# Patient Record
Sex: FEMALE | Race: WHITE | NOT HISPANIC OR LATINO | Employment: FULL TIME | URBAN - NONMETROPOLITAN AREA
[De-identification: names, ages, dates, MRNs, and addresses within clinical notes are randomized per-mention and may not be internally consistent; named-entity substitution may affect disease eponyms.]

---

## 2021-12-02 ENCOUNTER — APPOINTMENT (OUTPATIENT)
Dept: GENERAL RADIOLOGY | Facility: HOSPITAL | Age: 61
End: 2021-12-02

## 2021-12-02 PROCEDURE — 71046 X-RAY EXAM CHEST 2 VIEWS: CPT

## 2021-12-02 PROCEDURE — 86664 EPSTEIN-BARR NUCLEAR ANTIGEN: CPT | Performed by: NURSE PRACTITIONER

## 2021-12-02 PROCEDURE — 85027 COMPLETE CBC AUTOMATED: CPT | Performed by: NURSE PRACTITIONER

## 2021-12-02 PROCEDURE — 80053 COMPREHEN METABOLIC PANEL: CPT | Performed by: NURSE PRACTITIONER

## 2021-12-02 PROCEDURE — 86665 EPSTEIN-BARR CAPSID VCA: CPT | Performed by: NURSE PRACTITIONER

## 2021-12-02 PROCEDURE — U0004 COV-19 TEST NON-CDC HGH THRU: HCPCS | Performed by: NURSE PRACTITIONER

## 2021-12-12 ENCOUNTER — TRANSCRIBE ORDERS (OUTPATIENT)
Dept: ADMINISTRATIVE | Facility: HOSPITAL | Age: 61
End: 2021-12-12

## 2021-12-12 DIAGNOSIS — R20.0 ARM NUMBNESS: Primary | ICD-10-CM

## 2021-12-12 DIAGNOSIS — R93.0 ABNORMAL MRI OF THE HEAD: ICD-10-CM

## 2021-12-17 PROCEDURE — U0004 COV-19 TEST NON-CDC HGH THRU: HCPCS | Performed by: NURSE PRACTITIONER

## 2021-12-28 ENCOUNTER — HOSPITAL ENCOUNTER (OUTPATIENT)
Dept: MRI IMAGING | Facility: HOSPITAL | Age: 61
Discharge: HOME | End: 2021-12-28
Admitting: PSYCHIATRY & NEUROLOGY

## 2021-12-28 DIAGNOSIS — R20.0 ARM NUMBNESS: ICD-10-CM

## 2021-12-28 DIAGNOSIS — R93.0 ABNORMAL MRI OF THE HEAD: ICD-10-CM

## 2021-12-28 LAB — CREAT BLDA-MCNC: 0.8 MG/DL (ref 0.6–1.3)

## 2021-12-28 PROCEDURE — 70553 MRI BRAIN STEM W/O & W/DYE: CPT

## 2021-12-28 PROCEDURE — 0 GADOBENATE DIMEGLUMINE 529 MG/ML SOLUTION: Performed by: PSYCHIATRY & NEUROLOGY

## 2021-12-28 PROCEDURE — A9577 INJ MULTIHANCE: HCPCS | Performed by: PSYCHIATRY & NEUROLOGY

## 2021-12-28 PROCEDURE — 82565 ASSAY OF CREATININE: CPT

## 2021-12-28 RX ADMIN — GADOBENATE DIMEGLUMINE 18 ML: 529 INJECTION, SOLUTION INTRAVENOUS at 13:23

## 2022-01-12 PROBLEM — J30.2 SEASONAL ALLERGIC RHINITIS: Status: ACTIVE | Noted: 2019-06-17

## 2022-01-12 PROBLEM — F32.A DEPRESSIVE DISORDER: Status: ACTIVE | Noted: 2019-06-17

## 2022-01-12 RX ORDER — TOPIRAMATE 25 MG/1
TABLET ORAL
COMMUNITY
Start: 2021-09-27 | End: 2022-01-13 | Stop reason: SINTOL

## 2022-01-12 RX ORDER — IBUPROFEN 200 MG
200 TABLET ORAL EVERY 6 HOURS PRN
COMMUNITY

## 2022-01-13 ENCOUNTER — OFFICE VISIT (OUTPATIENT)
Dept: NEUROLOGY | Facility: CLINIC | Age: 62
End: 2022-01-13

## 2022-01-13 VITALS
WEIGHT: 194 LBS | HEIGHT: 65 IN | OXYGEN SATURATION: 95 % | SYSTOLIC BLOOD PRESSURE: 136 MMHG | BODY MASS INDEX: 32.32 KG/M2 | DIASTOLIC BLOOD PRESSURE: 82 MMHG | HEART RATE: 71 BPM

## 2022-01-13 DIAGNOSIS — G25.0 ESSENTIAL TREMOR: Primary | ICD-10-CM

## 2022-01-13 PROCEDURE — 99204 OFFICE O/P NEW MOD 45 MIN: CPT | Performed by: PSYCHIATRY & NEUROLOGY

## 2022-01-13 RX ORDER — PRIMIDONE 50 MG/1
TABLET ORAL
Qty: 30 TABLET | Refills: 2 | Status: SHIPPED | OUTPATIENT
Start: 2022-01-13 | End: 2022-01-31 | Stop reason: SDUPTHER

## 2022-01-13 NOTE — PROGRESS NOTES
"Chief Complaint  Tremors (x1.5 years and increased in intensity within the last 6 months. L>R) and Numbness (pt complains of occ left arm numbness/weakness with occ decrease  of left hand)    Subjective        VARUN Meyers presents to [facility]    61-year-old female who presents for evaluation of essential tremor.  She has recently moved to the area and had previously been taken care of by a neurologist in [place].  She was diagnosed with an essential tremor and has been prescribed Topamax.  She notes this helped a little but when taking 50 mg it knocked her out.  Dropping back to 1 pill a day, she still had the side effects.  This is the only medication they tried.  She notes that the tremor worse when typing, and it will cause her to hit a key twice.  It is worse on her left than her right.  Sometimes it is so bad she cannot pick it drink up.  She notices it more if she is tired, or if her blood sugar is down, or the day after drinking alcohol.          Past Medical History:   Diagnosis Date   • Tremor           Current Outpatient Medications:   •  cetirizine (zyrTEC) 10 MG tablet, Take 10 mg by mouth Daily., Disp: , Rfl:   •  ibuprofen (ADVIL,MOTRIN) 200 MG tablet, Take 200 mg by mouth Every 6 (Six) Hours As Needed., Disp: , Rfl:   •  sertraline (Zoloft) 25 MG tablet, Take 50 mg by mouth Daily. One 50 MG tablet and one 25 MG tablet to equal 75 MG daily, Disp: , Rfl:   •  sertraline (ZOLOFT) 50 MG tablet, Take 50 mg by mouth Daily., Disp: , Rfl:        Objective   Vital Signs:   Ht 165.1 cm (65\")   Wt 88 kg (194 lb)   BMI 32.28 kg/m²     Physical Exam  Constitutional:       General: She is awake.   Eyes:      Extraocular Movements: Extraocular movements intact.      Pupils: Pupils are equal, round, and reactive to light.   Neurological:      Mental Status: She is alert.      Deep Tendon Reflexes: Strength normal and reflexes are normal and symmetric.   Psychiatric:  "        Speech: Speech normal.        Neurological Exam  Mental Status  Awake and alert. Oriented to person, place and time. Recent and remote memory are intact. Speech is normal. Language is fluent with no aphasia. Attention and concentration are normal. Fund of knowledge is appropriate for level of education.    Cranial Nerves  CN II: Visual fields full to confrontation.  CN III, IV, VI: Extraocular movements intact bilaterally. Pupils equal round and reactive to light bilaterally.  CN V: Facial sensation is normal.  CN VII: Full and symmetric facial movement.  CN IX, X: Palate elevates symmetrically  CN XI: Shoulder shrug strength is normal.  CN XII: Tongue midline without atrophy or fasciculations.    Motor  Normal muscle bulk throughout. Normal muscle tone. Strength is 5/5 throughout all four extremities.  Low ampltide, high frequenty tremor on left more than right with arms outstretched  No rest tremor  Intention tremor on left more than right  No bradykinesia or rigidity    .    Sensory  Light touch is normal in upper and lower extremities.     Reflexes  Deep tendon reflexes are 2+ and symmetric in all four extremities with downgoing toes bilaterally.    Gait  Casual gait is normal including stance, stride, and arm swing.      Result Review :                     Assessment and Plan   61-year-old female with essential tremor.  She was not able to tolerate Topamax.  She has not tried any other medications for her tremor.  I hesitate to try her on propranolol as she has depression and this could worsen it.  We decided to try her on low-dose primidone.  She does drink alcohol daily and we discussed the additive effects that could cause increase CNS depression/sedation.    Plan:    1.  Start primidone 25 mg nightly.  Can increase to 50 mg nightly in 1 week if tolerating.  2. follow-up 3 months.  Call sooner with any issues.        Follow Up   No follow-ups on file.  Patient was given instructions and counseling  regarding her condition or for health maintenance advice. Please see specific information pulled into the AVS if appropriate.

## 2022-01-31 ENCOUNTER — TELEPHONE (OUTPATIENT)
Dept: NEUROLOGY | Facility: CLINIC | Age: 62
End: 2022-01-31

## 2022-01-31 DIAGNOSIS — G25.0 ESSENTIAL TREMOR: ICD-10-CM

## 2022-01-31 RX ORDER — PRIMIDONE 50 MG/1
50 TABLET ORAL NIGHTLY
Qty: 7 TABLET | Refills: 0 | Status: SHIPPED | OUTPATIENT
Start: 2022-01-31 | End: 2022-04-04

## 2022-01-31 NOTE — TELEPHONE ENCOUNTER
Spoke with the pt and advised that an rx has been sent to requested pharmacy. Pt verbalizes understanding.

## 2022-01-31 NOTE — TELEPHONE ENCOUNTER
Caller: VARUN Meyers    Relationship: Self     Best call back number: [phone]    What medications are you currently taking:   Current Outpatient Medications on File Prior to Visit   Medication Sig Dispense Refill   • cetirizine (zyrTEC) 10 MG tablet Take 10 mg by mouth Daily.     • ibuprofen (ADVIL,MOTRIN) 200 MG tablet Take 200 mg by mouth Every 6 (Six) Hours As Needed.     • primidone (MYSOLINE) 50 MG tablet Take 1/2 tab PO qhs for 1 week. Increase to 1 tab thereafter. 30 tablet 2   • sertraline (Zoloft) 25 MG tablet Take 50 mg by mouth Daily. One 50 MG tablet and one 25 MG tablet to equal 75 MG daily     • sertraline (ZOLOFT) 50 MG tablet Take 50 mg by mouth Daily.       No current facility-administered medications on file prior to visit.          When did you start taking these medications: NA    Which medication are you concerned about: PRIMIDONE    Who prescribed you this medication: DR.DIAL    What are your concerns: PATIENT STATES SHE IS OUT OF TOWN FOR WORK AND LEFT HER PRIMIDONE AT HOME. SHE IS ASKING IF SHE COULD HAVE A WEEK WORTH SENT TO PHARMACY WHILE SHE IS OUT OF TOWN. PHARMACY IS [facility]  [facility] IN [place], [place]. PHONE # IS: [phone].    PATIENT ASKING FOR CALL TO UPDATE ON THIS. PLEASE ADVISE.     How long have you had these concerns: ZACHARY

## 2022-02-09 ENCOUNTER — OFFICE VISIT (OUTPATIENT)
Dept: FAMILY MEDICINE CLINIC | Facility: CLINIC | Age: 62
End: 2022-02-09

## 2022-02-09 VITALS
DIASTOLIC BLOOD PRESSURE: 82 MMHG | HEART RATE: 76 BPM | SYSTOLIC BLOOD PRESSURE: 120 MMHG | TEMPERATURE: 97.6 F | HEIGHT: 65 IN | BODY MASS INDEX: 32.96 KG/M2 | WEIGHT: 197.8 LBS | OXYGEN SATURATION: 98 %

## 2022-02-09 DIAGNOSIS — Z80.3 FAMILY HISTORY OF BREAST CANCER IN MOTHER: ICD-10-CM

## 2022-02-09 DIAGNOSIS — M54.42 LOW BACK PAIN WITH LEFT-SIDED SCIATICA, UNSPECIFIED BACK PAIN LATERALITY, UNSPECIFIED CHRONICITY: Primary | ICD-10-CM

## 2022-02-09 DIAGNOSIS — Z86.010 HISTORY OF ADENOMATOUS POLYP OF COLON: ICD-10-CM

## 2022-02-09 PROCEDURE — 99204 OFFICE O/P NEW MOD 45 MIN: CPT | Performed by: FAMILY MEDICINE

## 2022-02-10 NOTE — PROGRESS NOTES
Chief Complaint  Establish Care    Subjective        History of Present Illness  VARUN Meyers is a 61 y.o. female who presents to [facility] patient.    I reviewed her intake paperwork with her. Her surgical history includes a  in  and a  in  and a skin cancer removal in .  Her only allergy is MORPHINE. She is up to date with her two COVID-19 shots and a booster, has had a flu shot for this season and is up to date with her Tdap, having had that in 2017 and completed the shingles vaccine last year. Her family history includes arthritis, diabetes, cancer, high blood pressure, heart attack, migraine headaches, and stroke. The patient is a current alcohol drinker but her use varies and she stopped smoking 1999. She has never used any chewing tobacco or other kinds of drugs. We reviewed her medications together. We also reviewed past labs.     The patient is taking Zoloft,  a 50  mg pill as well as a 25 mg pill because it is easier to take 2 pills than to crack the 50 mg pill and it is inexpensive. She has also been on primidone since seeing Dr. Masters on 2022. That office note was reviewed today. The tremor has improved some with the primidone and also specifically with cutting back on caffeine. She has noted the tremor gets worse when she goes too long without eating and her sugar is dropping. The patient has not tolerated Topamax from the neurologist in [place] and the patient also uses ibuprofen as needed for headache or any kind of pain. She has some back problems. She does take Zyrtec for allergies.     We reviewed her Care Gaps today and her mammograms have always been normal. Pap smears have always been normal. She had a colonoscopy in 2016. In 2016 she had 3 polyps removed, two of which were precancerous and one of which was benign, and she was told to have a 5-year recall. She currently needs a referral to  gastroenterology here.     She reports in 1994 she had a slipped disc in her low back which was treated by a chiropractor who put the disc back in place. Since then, her back has been weak. She states that she does not exercise. She complains of recent pain after stepping into a hole. The pain starts in her left buttocks and radiates down her posterior left thigh. The pain wraps around to the lateral aspect of her thigh near her left groin. She does have intermittent pain that radiates to her anterior left lower extremity. She did have a scan of her back at the time of the slipped disc. She denies any physical therapy or injections in her back. She was taking ibuprofen every 6 hours and now only takes as needed. She does take ibuprofen at night every couple of days. She denies loss of sensation in her feet.     Result Review :   The following data was reviewed by: Maegan Garcia MD on 02/09/2022:       Labs dated 09/27/2021 show:  •BMP was normal except for a mildly elevated chloride of 110.  •The lipid panel had a cholesterol of 205  •Triglycerides 227  •HDL 55  •  •The A1c was normal at 5.3.  •Free T4 and TSH were both normal at 0.77 and 2.89 respectively.    Labs dated 12/02/2021 show:  •CBC was normal.  •CMP had a glucose of 107  •Carbon dioxide level of 30.0, mildly elevated and otherwise was completely normal.    Chest x-rays from 12/02/2021 were reviewed today. These show:  •These were done for cough, shortness of breath, and bronchitis.   •The lungs were clear.   •The x-ray was negative for any acute abnormalities and the heart limits were normal.    She also had an MRI of her brain with and without contrast that day which shows:  •She had no acute intracranial abnormality.   •No signs of acute ischemia, hemorrhage, or mass, and no evidence of edema demyelinating process.   •She had no abnormal intracranial enhancement.   •She did have paranasal sinusitis.   •The MRI was done for workup of left hand  "tremor.    Also reviewed today were notes from Dr. Osmani Elliott in [place], [place] in the neurology department. She had a 1 year history of bilateral upper extremity tremor. Worse on the left than the right without any resting tremor. It was all an intentional tremor which was affecting her handwriting, using the computer and typing, putting on make-up and holding cups and glasses.     She had no sign of parkinsonism on exam. She did have a high frequency low amplitude postural and kinetic tremor as well as an intention tremor on the left upper extremity. With spiral analysis, she had left greater than right slight to mild intrusion of tremor. The neurologist started her on Topamax and wanted her to titrate to 50 mg at night. He did not give her a betablocker because she was on the SSRI and her heart rate was 60 and he avoided primidone, given the patient drank on a regular basis.        Past Medical History:   Diagnosis Date   • Depression    • Family history of colonic polyps    • History of colon polyps    • Seasonal allergies    • Tremor        Outpatient Medications Prior to Visit   Medication Sig Dispense Refill   • cetirizine (zyrTEC) 10 MG tablet Take 10 mg by mouth Daily.     • ibuprofen (ADVIL,MOTRIN) 200 MG tablet Take 200 mg by mouth Every 6 (Six) Hours As Needed.     • primidone (MYSOLINE) 50 MG tablet Take 1 tablet by mouth Every Night. 7 tablet 0   • sertraline (Zoloft) 25 MG tablet Take 50 mg by mouth Daily. One 50 MG tablet and one 25 MG tablet to equal 75 MG daily     • sertraline (ZOLOFT) 50 MG tablet Take 50 mg by mouth Daily.       No facility-administered medications prior to visit.        Objective   Vital Signs:   /82 (BP Location: Left arm, Patient Position: Sitting, Cuff Size: Adult)   Pulse 76   Temp 97.6 °F (36.4 °C) (Temporal)   Ht 165.1 cm (65\")   Wt 89.7 kg (197 lb 12.8 oz)   SpO2 98%   BMI 32.92 kg/m²       Physical Exam  Vitals reviewed.   Constitutional:     "   Appearance: Normal appearance. She is not ill-appearing.   HENT:      Right Ear: External ear normal.      Left Ear: External ear normal.      Nose: Nose normal.   Eyes:      Extraocular Movements: Extraocular movements intact.      Conjunctiva/sclera: Conjunctivae normal.   Cardiovascular:      Rate and Rhythm: Normal rate and regular rhythm.      Heart sounds: No murmur heard.      Pulmonary:      Effort: Pulmonary effort is normal.      Breath sounds: Normal breath sounds.   Abdominal:      Palpations: Abdomen is soft.      Tenderness: There is no abdominal tenderness.   Musculoskeletal:         General: No swelling. Normal range of motion.      Right lower leg: No edema.      Left lower leg: No edema.      Comments: Stiff in lower back, points to L lateral lumbar area and buttock as source of pain   Lymphadenopathy:      Head:      Right side of head: No tonsillar adenopathy.      Left side of head: No tonsillar adenopathy.      Cervical: No cervical adenopathy.   Skin:     General: Skin is warm.      Findings: No rash.   Neurological:      General: No focal deficit present.      Mental Status: She is alert and oriented to person, place, and time.      Comments: She has no significant tremor of either hand but on extending her hand and trying to hold her arm up tightly, she has a low amplitude, high frequency, slight tremor of the fingers. She has no tremor of her head or neck. No interference with her speech pattern and she has no difficulty changing positions. Her gait appears normal and she is not off balance.   Psychiatric:         Mood and Affect: Mood normal.         Behavior: Behavior normal.               Assessment and Plan    Diagnoses and all orders for this visit:    1. Low back pain with left-sided sciatica, unspecified back pain laterality, unspecified chronicity (Primary)  - The plan is for her to see Dr. Edward for OMT on her back and some readjustment and balancing. I expect that will relieve  the pain that is shooting into her left buttock area and eased some of the lower back pain that has been causing her to have to take more ibuprofen than usual and been interfering with her sleep.     2. History of adenomatous polyp of colon  -     Ambulatory Referral to Gastroenterology    3. Family history of breast cancer in mother  - She will need a mammogram sometime in the next 6 to 12 months. Her last mammogram was done in the late 09/2021, early 10/2021 timeframe, and she is willing to wait until a year from now. She will come in sooner if she has any new lumps or anything she is concerned about given her family history of breast cancer in her mother. She will put the Pap off until she comes a year from now and has requested a referral to GI for her history of precancerous colon polyps. She is not having any GI symptoms at this time.     She does not know how much Zoloft she has left and will call here as needed for refills. The primidone will be prescribed by Dr. Masters and she has a follow-up there in 04/2022. She declined a prescription for higher dose ibuprofen today and will continue to use the over the counter pills taking up to 600 mg at a time.      She will call later in the year if she wants to do her mammogram sooner than next 02/2023 and follow up with me as needed before then.         Follow Up   Return for Annual physical/ Well [facility]/Pap.    Patient was given instructions and counseling regarding her condition or for health maintenance advice.     Please see specific information/handouts pulled into the AVS if appropriate.     Maegan Garcia M.D.  [facility]   Family Medicine    Patient verbalized consent to the visit recording.  Transcribed from ambient dictation for Maegan Garcia MD by Pebbles Mckenzie.   02/10/22   15:28 CST

## 2022-02-21 ENCOUNTER — OFFICE VISIT (OUTPATIENT)
Dept: FAMILY MEDICINE CLINIC | Facility: CLINIC | Age: 62
End: 2022-02-21

## 2022-02-21 VITALS
OXYGEN SATURATION: 97 % | DIASTOLIC BLOOD PRESSURE: 79 MMHG | BODY MASS INDEX: 33.49 KG/M2 | SYSTOLIC BLOOD PRESSURE: 128 MMHG | HEIGHT: 65 IN | TEMPERATURE: 98 F | HEART RATE: 75 BPM | WEIGHT: 201 LBS

## 2022-02-21 DIAGNOSIS — M99.04 SOMATIC DYSFUNCTION OF SPINE, SACRAL: ICD-10-CM

## 2022-02-21 DIAGNOSIS — G89.29 CHRONIC MIDLINE LOW BACK PAIN WITHOUT SCIATICA: Primary | ICD-10-CM

## 2022-02-21 DIAGNOSIS — M99.05 SOMATIC DYSFUNCTION OF PELVIC REGION: ICD-10-CM

## 2022-02-21 DIAGNOSIS — M99.03 SOMATIC DYSFUNCTION OF SPINE, LUMBAR: ICD-10-CM

## 2022-02-21 DIAGNOSIS — M99.06 SOMATIC DYSFUNCTION OF LOWER EXTREMITY: ICD-10-CM

## 2022-02-21 DIAGNOSIS — M54.50 CHRONIC MIDLINE LOW BACK PAIN WITHOUT SCIATICA: Primary | ICD-10-CM

## 2022-02-21 PROCEDURE — 99213 OFFICE O/P EST LOW 20 MIN: CPT | Performed by: FAMILY MEDICINE

## 2022-02-21 PROCEDURE — 98926 OSTEOPATH MANJ 3-4 REGIONS: CPT | Performed by: FAMILY MEDICINE

## 2022-02-23 ENCOUNTER — OFFICE VISIT (OUTPATIENT)
Dept: GASTROENTEROLOGY | Facility: CLINIC | Age: 62
End: 2022-02-23

## 2022-02-23 VITALS
DIASTOLIC BLOOD PRESSURE: 78 MMHG | BODY MASS INDEX: 32.82 KG/M2 | HEIGHT: 65 IN | HEART RATE: 86 BPM | TEMPERATURE: 97.4 F | SYSTOLIC BLOOD PRESSURE: 130 MMHG | OXYGEN SATURATION: 98 % | WEIGHT: 197 LBS

## 2022-02-23 DIAGNOSIS — Z83.71 FAMILY HISTORY OF POLYPS IN THE COLON: ICD-10-CM

## 2022-02-23 DIAGNOSIS — Z86.010 HISTORY OF ADENOMATOUS POLYP OF COLON: Primary | ICD-10-CM

## 2022-02-23 PROBLEM — Z86.0101 HISTORY OF ADENOMATOUS POLYP OF COLON: Status: ACTIVE | Noted: 2022-02-23

## 2022-02-23 PROBLEM — Z83.719 FAMILY HISTORY OF POLYPS IN THE COLON: Status: ACTIVE | Noted: 2022-02-23

## 2022-02-23 PROCEDURE — S0285 CNSLT BEFORE SCREEN COLONOSC: HCPCS | Performed by: NURSE PRACTITIONER

## 2022-02-23 RX ORDER — SODIUM PICOSULFATE, MAGNESIUM OXIDE, AND ANHYDROUS CITRIC ACID 10; 3.5; 12 MG/160ML; G/160ML; G/160ML
1 LIQUID ORAL TAKE AS DIRECTED
Qty: 160 ML | Refills: 0 | Status: SHIPPED | OUTPATIENT
Start: 2022-02-23 | End: 2022-03-18 | Stop reason: HOSPADM

## 2022-02-23 NOTE — PROGRESS NOTES
"Chief Complaint   Patient presents with   • Colon Cancer Screening     Pt presents today for evaluation for colonoscopy-pt's last colon was in 2016 in [place]-pt states she had 3 polyps-2 were precancerous and one was benign; Family history of colon polyps      Subjective   HPI  VARUN Meyers is a 61 y.o. female who presents as a referral for preventative maintenance. She has no complaints of nausea or vomiting. No change in bowels. No wt loss. No BRBPR. No melena. There is no family hx for colon cancer. No abdominal pain. There was no Cologuard screening this year.  The patient tells me that she had a colonoscopy \"sometime in 2016 between August and the end of the year with findings of 3 polyps and 2 were precancerous.\"  Past Medical History:   Diagnosis Date   • Depression    • Family history of colonic polyps    • History of colon polyps    • Seasonal allergies    • Tremor      Past Surgical History:   Procedure Laterality Date   •  SECTION      X 2   • MOLE REMOVAL      From nose       Current Outpatient Medications:   •  cetirizine (zyrTEC) 10 MG tablet, Take 10 mg by mouth Daily., Disp: , Rfl:   •  ibuprofen (ADVIL,MOTRIN) 200 MG tablet, Take 200 mg by mouth Every 6 (Six) Hours As Needed., Disp: , Rfl:   •  primidone (MYSOLINE) 50 MG tablet, Take 1 tablet by mouth Every Night., Disp: 7 tablet, Rfl: 0  •  sertraline (Zoloft) 25 MG tablet, Take 50 mg by mouth Daily. One 50 MG tablet and one 25 MG tablet to equal 75 MG daily, Disp: , Rfl:   •  sertraline (ZOLOFT) 50 MG tablet, Take 50 mg by mouth Daily., Disp: , Rfl:   •  Sod Picosulfate-Mag Ox-Cit Acd (Clenpiq) 10-3.5-12 MG-GM -GM/160ML solution, Take 160 mL by mouth Take As Directed., Disp: 160 mL, Rfl: 0  Allergies   Allergen Reactions   • Morphine Itching and Swelling          • Topamax [Topiramate] Confusion     Brain fog, increased somnolence     Social History     Socioeconomic History   • Marital status:    Tobacco Use   • Smoking " Assessment:   Carlos Bush was seen today for breast pain  Diagnoses and all orders for this visit:    Breast pain, left        Plan:  Sent to [facility] for evaluation  Aware they will give her results at time of appt  Rec  D/c underwire bra , try ibuprofen for pain  Advised to use hibiclen solutions to under arms and groin when shaving to prevent infections  Subjective:    Patient is a 27 y o  y o  Female who presents for a problem visit  Pt is c/o + breast pain and - breast lump  Sx's are in the left side  Sx's started 1 weeks ago  Patient reports that sx's are not related to her menses  Pt reports - changes to her diet  She drinks 1 caffeinated products daily  Pt reports - breast trauma  Pt reports - changes in diet and activity  Pt reports - nipple discharge  States sxs started a week ago , experienced a sharp shooting pain that radiated to left breast  Notices it is worse when she lies down  on that side  Also c/o small bump near l axilla that was tender but resolving with alcohol to area  States gets this when shaving  There is no problem list on file for this patient  Past Medical History:   Diagnosis Date    Obesity        No past surgical history on file  Family History   Problem Relation Age of Onset    No Known Problems Mother     Heart attack Father     Hypertension Father     Hyperlipidemia Father        Social History     Social History    Marital status: Single     Spouse name: N/A    Number of children: N/A    Years of education: N/A     Occupational History    Not on file       Social History Main Topics    Smoking status: Former Smoker    Smokeless tobacco: Never Used    Alcohol use Yes    Drug use: No    Sexual activity: Yes     Partners: Male     Birth control/ protection: Condom Male     Other Topics Concern    Not on file     Social History Narrative    No narrative on file         Current Outpatient Prescriptions:     Cholecalciferol (VITAMIN D PO), Take "status: Former Smoker   • Smokeless tobacco: Never Used   • Tobacco comment: 1999   Vaping Use   • Vaping Use: Never used   Substance and Sexual Activity   • Alcohol use: Yes     Comment: Occasionally    • Drug use: Never   • Sexual activity: Never     Family History   Problem Relation Age of Onset   • Breast cancer Mother    • Migraines Mother    • Diabetes type II Father    • Heart disease Father    • Stroke Father    • Colon polyps Maternal Uncle         Unknown age   • Colon cancer Neg Hx    • Esophageal cancer Neg Hx    • Liver disease Neg Hx    • Ulcerative colitis Neg Hx    • Rectal cancer Neg Hx    • Stomach cancer Neg Hx        REVIEW OF SYSTEMS  General: well appearing, no fever chills or sweats, no unexplained wt loss  HEENT: no acute visual or hearing disturbances  Cardiovascular: No chest pain or palpitations  Pulmonary: No shortness of breath, coughing, wheezing or hemoptysis  : No burning, urgency, hematuria, or dysuria  Musculoskeletal: No joint pain or stiffness  Peripheral: no edema  Skin: No lesions or rashes  Neuro: No dizziness, headaches, stroke, syncope  Endocrine: No hot or cold intolerances  Hematological: No blood dyscrasias    Objective   Vitals:    02/23/22 0930   BP: 130/78   BP Location: Left arm   Patient Position: Sitting   Cuff Size: Adult   Pulse: 86   Temp: 97.4 °F (36.3 °C)   TempSrc: Infrared   SpO2: 98%   Weight: 89.4 kg (197 lb)   Height: 165.1 cm (65\")         PHYSICAL EXAM  General: age appropriate well nourished well appearing, no acute distress  Head: normocephalic and atraumatic  Global assessment-supple  Neck-No JVD noted, no lymphadenopathy  Pulmonary-clear to auscultation bilaterally, normal respiratory effort  Cardiovascular-normal rate and rhythm, normal heart sounds, S1 and S2 noted  Abdomen-soft, non tender, non distended, normal bowel sounds all 4 quadrants, no hepatosplenomegaly noted  Extremities-No clubbing cyanosis or edema  Neuro-Non focal, converses " by mouth daily, Disp: , Rfl:     Cyanocobalamin (B-12 PO), Take by mouth daily, Disp: , Rfl:     multivitamin (THERAGRAN) TABS, Take 1 tablet by mouth daily, Disp: , Rfl:     No Known Allergies    Review of Systems  Constitutional :no fever, feels well, no tiredness, no recent weight gain or loss  ENT: no ear ache, no loss of hearing, no nosebleeds or nasal discharge, no sore throat or hoarseness  Cardiovascular: no complaints of slow or fast heart beat, no chest pain, no palpitations, no leg claudication or lower extremity edema  Respiratory: no complaints of shortness of shortness of breath, no GILLESPIE  Breasts:no complaints of breast pain, breast lump, or nipple discharge  Gastrointestinal: no complaints of abdominal pain, constipation, nausea, vomiting, or diarrhea or bloody stools  Genitourinary : no complaints of dysuria, incontinence, pelvic pain, no dysmenorrhea, vaginal discharge or abnormal vaginal bleeding and as noted in HPI  Musculoskeletal: no complaints of arthralgia, no myalgia, no joint swelling or      stiffness, no limb pain or swelling  Integumentary: no complaints of skin rash or lesion, itching or dry skin  Neurological: no complaints of headache, no confusion, no numbness or tingling, no dizziness or fainting    Constitutional   General appearance: No acute distress, well appearing and well nourished  Psychiatric   Orientation to person, place, and time: Normal     Mood and affect: Normal     Breast  Breasts: breasts appear normal, no suspicious masses, no skin or nipple changes or axillary nodes   Moderate tenderness with compression at 6 oclock  left nipple  Resolving superficial  bump in area where she shaves noted  appropriately, awake, alert, oriented    Lab Results - Last 18 Months   Lab Units 12/28/21  1218 12/02/21  1303   GLUCOSE mg/dL  --  107*   BUN mg/dL  --  12   CREATININE mg/dL 0.80 0.70   SODIUM mmol/L  --  141   POTASSIUM mmol/L  --  4.2   CHLORIDE mmol/L  --  103   CO2 mmol/L  --  30.0*   TOTAL PROTEIN g/dL  --  6.9   ALBUMIN g/dL  --  4.50   ALT (SGPT) U/L  --  28   AST (SGOT) U/L  --  20   ALK PHOS U/L  --  84   BILIRUBIN mg/dL  --  0.5   GLOBULIN gm/dL  --  2.4       Lab Results - Last 18 Months   Lab Units 12/02/21  1303   HEMOGLOBIN g/dL 13.9   HEMATOCRIT % 42.8   MCV fL 93.4   WBC 10*3/mm3 4.78   RDW % 12.3   MPV fL 10.0   PLATELETS 10*3/mm3 154           Imaging Results (Most Recent)     None        Assessment/Plan   Diagnoses and all orders for this visit:    1. History of adenomatous polyp of colon (Primary)  -     Case Request; Standing  -     Case Request    2. Family history of polyps in the colon  -     Case Request; Standing  -     Case Request    Other orders  -     Follow Anesthesia Guidelines / Protocol; Future  -     Obtain Informed Consent; Future  -     Sod Picosulfate-Mag Ox-Cit Acd (Clenpiq) 10-3.5-12 MG-GM -GM/160ML solution; Take 160 mL by mouth Take As Directed.  Dispense: 160 mL; Refill: 0      COLONOSCOPY WITH ANESTHESIA (N/A)             All risks, benefits, alternatives, and indications of colonoscopy procedure have been discussed with the patient. Risks to include perforation of the colon requiring possible surgery or colostomy, risk of bleeding from biopsies or removal of colon tissue, possibility of missing a colon polyp or cancer, or adverse drug reaction.  Benefits to include the diagnosis and management of disease of the colon and rectum. Alternatives to include barium enema, radiographic evaluation, lab testing or no intervention. Pt verbalizes understanding and agrees.

## 2022-03-16 ENCOUNTER — OFFICE VISIT (OUTPATIENT)
Dept: FAMILY MEDICINE CLINIC | Facility: CLINIC | Age: 62
End: 2022-03-16

## 2022-03-16 VITALS
SYSTOLIC BLOOD PRESSURE: 137 MMHG | DIASTOLIC BLOOD PRESSURE: 77 MMHG | HEIGHT: 65 IN | OXYGEN SATURATION: 99 % | TEMPERATURE: 97.2 F | BODY MASS INDEX: 32.32 KG/M2 | HEART RATE: 88 BPM | WEIGHT: 194 LBS

## 2022-03-16 DIAGNOSIS — M99.04 SOMATIC DYSFUNCTION OF SPINE, SACRAL: ICD-10-CM

## 2022-03-16 DIAGNOSIS — F32.A DEPRESSIVE DISORDER: Primary | ICD-10-CM

## 2022-03-16 DIAGNOSIS — M99.05 SOMATIC DYSFUNCTION OF PELVIC REGION: ICD-10-CM

## 2022-03-16 DIAGNOSIS — G89.29 CHRONIC MIDLINE LOW BACK PAIN, UNSPECIFIED WHETHER SCIATICA PRESENT: Primary | ICD-10-CM

## 2022-03-16 DIAGNOSIS — M54.50 CHRONIC MIDLINE LOW BACK PAIN, UNSPECIFIED WHETHER SCIATICA PRESENT: Primary | ICD-10-CM

## 2022-03-16 DIAGNOSIS — M99.06 SOMATIC DYSFUNCTION OF LOWER EXTREMITY: ICD-10-CM

## 2022-03-16 DIAGNOSIS — M99.03 SOMATIC DYSFUNCTION OF SPINE, LUMBAR: ICD-10-CM

## 2022-03-16 PROCEDURE — 98926 OSTEOPATH MANJ 3-4 REGIONS: CPT | Performed by: FAMILY MEDICINE

## 2022-03-16 PROCEDURE — 99213 OFFICE O/P EST LOW 20 MIN: CPT | Performed by: FAMILY MEDICINE

## 2022-03-16 RX ORDER — SERTRALINE HYDROCHLORIDE 25 MG/1
50 TABLET, FILM COATED ORAL DAILY
Qty: 90 TABLET | Refills: 1 | Status: SHIPPED | OUTPATIENT
Start: 2022-03-16 | End: 2022-03-24 | Stop reason: SDUPTHER

## 2022-03-16 NOTE — TELEPHONE ENCOUNTER
Rx Refill Note  Requested Prescriptions     Pending Prescriptions Disp Refills   • sertraline (ZOLOFT) 50 MG tablet 90 tablet 1     Sig: Take 1 tablet by mouth Daily.   • sertraline (ZOLOFT) 25 MG tablet 90 tablet 1     Sig: Take 2 tablets by mouth Daily. One 50 MG tablet and one 25 MG tablet to equal 75 MG daily      Last office visit with prescribing clinician: 2/9/2022      Next office visit with prescribing clinician: 2/13/2023            MARY Colon  03/16/22, 16:29 CDT

## 2022-03-17 NOTE — PROGRESS NOTES
"CC:   Chief Complaint   Patient presents with   • Follow-up     OMT   • Back Pain     Current pain 6/10, worst pain 9/10       History:  VARUN Meyers is a 61 y.o. female who presents today for follow-up for evaluation of the above:    The patient presents today for a follow-up for OMT.    Pain  The patient reports her back pain has improved, however, she continues to have pain in her left lower extremity. The patient states she has been in the process of moving, which is not helping her pain.     ROS:  Review of Systems was performed, and pertinent findings are noted in the HPI.    Ms. Meyers  reports that she quit smoking about 22 years ago. Her smoking use included cigarettes. She started smoking about 44 years ago. She has a 10.00 pack-year smoking history. She has never used smokeless tobacco. She reports current alcohol use. She reports that she does not use drugs.      Current Outpatient Medications:   •  cetirizine (zyrTEC) 10 MG tablet, Take 10 mg by mouth Daily., Disp: , Rfl:   •  ibuprofen (ADVIL,MOTRIN) 200 MG tablet, Take 200 mg by mouth Every 6 (Six) Hours As Needed., Disp: , Rfl:   •  primidone (MYSOLINE) 50 MG tablet, Take 1 tablet by mouth Every Night., Disp: 7 tablet, Rfl: 0  •  Triamcinolone Acetonide (NASACORT AQ NA), into the nostril(s) as directed by provider., Disp: , Rfl:   •  sertraline (ZOLOFT) 25 MG tablet, Take 2 tablets by mouth Daily. One 50 MG tablet and one 25 MG tablet to equal 75 MG daily, Disp: 90 tablet, Rfl: 1  •  sertraline (ZOLOFT) 50 MG tablet, Take 1 tablet by mouth Daily., Disp: 90 tablet, Rfl: 1      OBJECTIVE:  /77 (BP Location: Left arm, Patient Position: Sitting, Cuff Size: Adult)   Pulse 88   Temp 97.2 °F (36.2 °C) (Temporal)   Ht 165.1 cm (65\")   Wt 88 kg (194 lb)   SpO2 99%   BMI 32.28 kg/m²    Physical Exam  Vitals and nursing note reviewed.   Constitutional:       General: She is not in acute distress.     Appearance: She is not diaphoretic. "   HENT:      Head: Normocephalic and atraumatic.      Nose: Nose normal.   Eyes:      General: No scleral icterus.        Right eye: No discharge.         Left eye: No discharge.      Conjunctiva/sclera: Conjunctivae normal.   Neck:      Trachea: No tracheal deviation.   Pulmonary:      Effort: Pulmonary effort is normal.   Skin:     General: Skin is warm and dry.      Coloration: Skin is not pale.   Neurological:      Mental Status: She is alert and oriented to person, place, and time.   Psychiatric:         Behavior: Behavior normal.         Thought Content: Thought content normal.         Judgment: Judgment normal.     Osteopathic Structural Exam  Procedure Note for Osteopathic Manipulative Treatment    Pre-procedure diagnoses: Somatic dysfunctions as listed below.  Consent: Oral consent given for Osteopathic Treatment  Post-procedure diagnoses: same  Complications of procedure: none, patient tolerated procedure well    The evaluation including the history, physical exam and the management decisions, indicate than an appropriate intervention on this date of service is osteopathic manipulative treatment. Oral permission for the osteopathic procedure was obtained. The following treatment methods and the responses for each body region are listed below.        Region Somatic Dysfunction Severity OMT technique Response      Lumbar L3 ERSR, L2 ERSL. Moderate Balanced ligamentous tension Improved      Sacral Right unilateral flexion. Moderate Balanced ligamentous tension Improved   Pelvic Left anterior rotation, and bilateral posterior rotation.  Moderate Balanced ligamentous tension Improved      Lower Extremities Left tibiofemoral compression, left psoas spasm.  Moderate  Balanced ligamentous tension Improved        Assessment/Plan     Diagnosis Plan   1. Chronic midline low back pain, unspecified whether sciatica present     2. Somatic dysfunction of spine, lumbar     3. Somatic dysfunction of spine, sacral     4.  Somatic dysfunction of pelvic region     5. Somatic dysfunction of lower extremity      OMT to balance autonomic tone, improve fascial symmetry and respiratory/circulatory/lymphatic compliance      The patient will return in 1-month for an OMT follow-up or sooner if needed.     Solomon Edward DO   Family Medicine  Osteopathic Neuromusculoskeletal Medicine      Transcribed from ambient dictation for Solomon Edward DO by KAITLYNN WEST.  03/16/22   23:17 CDT    Patient verbalized consent to the visit recording.  I have personally performed the services described in this document as transcribed by the above individual, and it is both accurate and complete.  Solomon Edward DO  3/20/2022  16:00 CDT

## 2022-03-18 ENCOUNTER — ANESTHESIA (OUTPATIENT)
Dept: GASTROENTEROLOGY | Facility: HOSPITAL | Age: 62
End: 2022-03-18

## 2022-03-18 ENCOUNTER — HOSPITAL ENCOUNTER (OUTPATIENT)
Facility: HOSPITAL | Age: 62
Setting detail: HOSPITAL OUTPATIENT SURGERY
Discharge: HOME | End: 2022-03-18
Attending: INTERNAL MEDICINE | Admitting: INTERNAL MEDICINE

## 2022-03-18 ENCOUNTER — ANESTHESIA EVENT (OUTPATIENT)
Dept: GASTROENTEROLOGY | Facility: HOSPITAL | Age: 62
End: 2022-03-18

## 2022-03-18 VITALS
HEIGHT: 65 IN | OXYGEN SATURATION: 97 % | SYSTOLIC BLOOD PRESSURE: 96 MMHG | HEART RATE: 57 BPM | WEIGHT: 197 LBS | DIASTOLIC BLOOD PRESSURE: 70 MMHG | BODY MASS INDEX: 32.82 KG/M2 | TEMPERATURE: 96.7 F | RESPIRATION RATE: 18 BRPM

## 2022-03-18 DIAGNOSIS — Z86.010 HISTORY OF ADENOMATOUS POLYP OF COLON: ICD-10-CM

## 2022-03-18 DIAGNOSIS — Z83.71 FAMILY HISTORY OF POLYPS IN THE COLON: ICD-10-CM

## 2022-03-18 PROCEDURE — 88305 TISSUE EXAM BY PATHOLOGIST: CPT | Performed by: INTERNAL MEDICINE

## 2022-03-18 PROCEDURE — 45385 COLONOSCOPY W/LESION REMOVAL: CPT | Performed by: INTERNAL MEDICINE

## 2022-03-18 PROCEDURE — 25010000002 PROPOFOL 10 MG/ML EMULSION: Performed by: NURSE ANESTHETIST, CERTIFIED REGISTERED

## 2022-03-18 RX ORDER — LIDOCAINE HYDROCHLORIDE 10 MG/ML
0.5 INJECTION, SOLUTION EPIDURAL; INFILTRATION; INTRACAUDAL; PERINEURAL ONCE AS NEEDED
Status: CANCELLED | OUTPATIENT
Start: 2022-03-18

## 2022-03-18 RX ORDER — PROPOFOL 10 MG/ML
VIAL (ML) INTRAVENOUS AS NEEDED
Status: DISCONTINUED | OUTPATIENT
Start: 2022-03-18 | End: 2022-03-18 | Stop reason: SURG

## 2022-03-18 RX ORDER — SODIUM CHLORIDE 0.9 % (FLUSH) 0.9 %
10 SYRINGE (ML) INJECTION AS NEEDED
Status: DISCONTINUED | OUTPATIENT
Start: 2022-03-18 | End: 2022-03-18 | Stop reason: HOSPADM

## 2022-03-18 RX ORDER — SODIUM CHLORIDE 9 MG/ML
500 INJECTION, SOLUTION INTRAVENOUS CONTINUOUS PRN
Status: DISCONTINUED | OUTPATIENT
Start: 2022-03-18 | End: 2022-03-18 | Stop reason: HOSPADM

## 2022-03-18 RX ORDER — LIDOCAINE HYDROCHLORIDE 20 MG/ML
INJECTION, SOLUTION EPIDURAL; INFILTRATION; INTRACAUDAL; PERINEURAL AS NEEDED
Status: DISCONTINUED | OUTPATIENT
Start: 2022-03-18 | End: 2022-03-18 | Stop reason: SURG

## 2022-03-18 RX ADMIN — PROPOFOL 200 MG: 10 INJECTION, EMULSION INTRAVENOUS at 09:14

## 2022-03-18 RX ADMIN — LIDOCAINE HYDROCHLORIDE 50 MG: 20 INJECTION, SOLUTION EPIDURAL; INFILTRATION; INTRACAUDAL; PERINEURAL at 09:09

## 2022-03-18 RX ADMIN — SODIUM CHLORIDE 500 ML: 9 INJECTION, SOLUTION INTRAVENOUS at 07:48

## 2022-03-18 RX ADMIN — PROPOFOL 200 MG: 10 INJECTION, EMULSION INTRAVENOUS at 09:09

## 2022-03-18 NOTE — ANESTHESIA POSTPROCEDURE EVALUATION
Patient: VARUN Meyers    Procedure Summary     Date: 03/18/22 Room / Location: [facility] ENDOSCOPY 2 / [facility]    Anesthesia Start: 0905 Anesthesia Stop: 0924    Procedure: COLONOSCOPY WITH ANESTHESIA (N/A ) Diagnosis:       History of adenomatous polyp of colon      Family history of polyps in the colon      (History of adenomatous polyp of colon [Z86.010])      (Family history of polyps in the colon [Z83.71])    Surgeons: Keysha Disla MD Provider: Osmani Cates CRNA    Anesthesia Type: MAC ASA Status: 2          Anesthesia Type: MAC    Vitals  No vitals data found for the desired time range.          Post Anesthesia Care and Evaluation    Patient location during evaluation: PHASE II  Level of consciousness: awake and alert  Pain management: adequate  Airway patency: patent  Anesthetic complications: No anesthetic complications    Cardiovascular status: acceptable  Respiratory status: acceptable  Hydration status: acceptable

## 2022-03-18 NOTE — ANESTHESIA PREPROCEDURE EVALUATION
Anesthesia Evaluation     Patient summary reviewed   no history of anesthetic complications:  NPO Solid Status: > 8 hours             Airway   Mallampati: II  Dental      Pulmonary    (+) sleep apnea,   Cardiovascular - negative cardio ROS  Exercise tolerance: excellent (>7 METS)        Neuro/Psych- negative ROS  GI/Hepatic/Renal/Endo    (+) obesity,       Musculoskeletal     Abdominal    Substance History      OB/GYN          Other                        Anesthesia Plan    ASA 2     MAC       Anesthetic plan, all risks, benefits, and alternatives have been provided, discussed and informed consent has been obtained with: patient.        CODE STATUS:

## 2022-03-21 LAB
LAB AP CASE REPORT: NORMAL
PATH REPORT.FINAL DX SPEC: NORMAL
PATH REPORT.GROSS SPEC: NORMAL

## 2022-03-24 DIAGNOSIS — F32.A DEPRESSIVE DISORDER: ICD-10-CM

## 2022-03-24 RX ORDER — SERTRALINE HYDROCHLORIDE 25 MG/1
25 TABLET, FILM COATED ORAL DAILY
Qty: 90 TABLET | Refills: 1 | Status: SHIPPED | OUTPATIENT
Start: 2022-03-24 | End: 2022-12-30

## 2022-03-27 NOTE — PROGRESS NOTES
I am writing to inform you that the polyp removed from the colon was not even a polyp. It was just a benign colon fold.  Because of previous history of colon polyps, we will repeat colonoscopy in 5 years as planned.    If you have any question, please call our office.    Sincerely,      Keysha Disla MD

## 2022-04-04 DIAGNOSIS — G25.0 ESSENTIAL TREMOR: ICD-10-CM

## 2022-04-04 RX ORDER — PRIMIDONE 50 MG/1
50 TABLET ORAL NIGHTLY
Qty: 90 TABLET | Refills: 0 | Status: SHIPPED | OUTPATIENT
Start: 2022-04-04 | End: 2022-08-11

## 2022-04-19 ENCOUNTER — OFFICE VISIT (OUTPATIENT)
Dept: NEUROLOGY | Facility: CLINIC | Age: 62
End: 2022-04-19

## 2022-04-19 VITALS
OXYGEN SATURATION: 97 % | BODY MASS INDEX: 32.49 KG/M2 | DIASTOLIC BLOOD PRESSURE: 84 MMHG | HEART RATE: 77 BPM | WEIGHT: 195 LBS | SYSTOLIC BLOOD PRESSURE: 124 MMHG | HEIGHT: 65 IN

## 2022-04-19 DIAGNOSIS — G25.0 ESSENTIAL TREMOR: Primary | ICD-10-CM

## 2022-04-19 PROCEDURE — 99214 OFFICE O/P EST MOD 30 MIN: CPT | Performed by: PSYCHIATRY & NEUROLOGY

## 2022-04-19 NOTE — PROGRESS NOTES
"Chief Complaint  Tremors (Pt states since starting primidone she has noticed an improvement with her bilateral hand tremors.)    Subjective        VARUN Meyers presents to [facility]    60 yo here for f/u of tremor. Doing better on primidone. Hard to get up in the morning though.          Past Medical History:   Diagnosis Date   • Colon polyp 5 Years ago   • Depression    • Diverticulosis 5 years ago   • Family history of colonic polyps    • History of colon polyps    • Low back pain    • Obesity    • Seasonal allergies    • Sleep apnea     Never officially tested   • Tremor           Current Outpatient Medications:   •  cetirizine (zyrTEC) 10 MG tablet, Take 10 mg by mouth Daily., Disp: , Rfl:   •  ibuprofen (ADVIL,MOTRIN) 200 MG tablet, Take 200 mg by mouth Every 6 (Six) Hours As Needed., Disp: , Rfl:   •  primidone (MYSOLINE) 50 MG tablet, Take 1 tablet by mouth Every Night., Disp: 90 tablet, Rfl: 0  •  sertraline (ZOLOFT) 25 MG tablet, Take 1 tablet by mouth Daily. Along with the 50 mg sertraline for mood.  Take with food., Disp: 90 tablet, Rfl: 1  •  sertraline (ZOLOFT) 50 MG tablet, Take 1 tablet by mouth Daily. Along with the 25 mg sertraline pill for mood.  Take with food., Disp: 90 tablet, Rfl: 1  •  Triamcinolone Acetonide (NASACORT AQ NA), into the nostril(s) as directed by provider., Disp: , Rfl:        Objective   Vital Signs:   /84 (BP Location: Left arm, Patient Position: Sitting, Cuff Size: Adult)   Pulse 77   Ht 165.1 cm (65\")   Wt 88.5 kg (195 lb)   SpO2 97%   BMI 32.45 kg/m²     Physical Exam  Constitutional:       General: She is awake.   Eyes:      Extraocular Movements: Extraocular movements intact.      Pupils: Pupils are equal, round, and reactive to light.   Neurological:      Mental Status: She is alert.      Deep Tendon Reflexes: Strength normal and reflexes are normal and symmetric.   Psychiatric:         Speech: Speech normal.          Result " Review :                     Assessment and Plan   61-year-old female with essential tremor.  She was not able to tolerate Topamax.   Started on primidone and getting better.  She does drink alcohol daily and we discussed the additive effects that could cause increase CNS depression/sedation.  She does have some trouble getting up in the morning on the primidone, but we discussed taking in a little bit earlier in the night.    Plan:    1.  Continue primidone 50 mg nightly but can take it a little bit earlier in the night.  2.  Follow-up 6 months.        Follow Up   No follow-ups on file.  Patient was given instructions and counseling regarding her condition or for health maintenance advice. Please see specific information pulled into the AVS if appropriate.

## 2022-04-20 ENCOUNTER — OFFICE VISIT (OUTPATIENT)
Dept: FAMILY MEDICINE CLINIC | Facility: CLINIC | Age: 62
End: 2022-04-20

## 2022-04-20 VITALS
SYSTOLIC BLOOD PRESSURE: 133 MMHG | HEIGHT: 65 IN | TEMPERATURE: 97.4 F | DIASTOLIC BLOOD PRESSURE: 79 MMHG | HEART RATE: 81 BPM | BODY MASS INDEX: 32.99 KG/M2 | OXYGEN SATURATION: 99 % | WEIGHT: 198 LBS

## 2022-04-20 DIAGNOSIS — M99.09 SEGMENTAL AND SOMATIC DYSFUNCTION OF ABDOMEN AND OTHER REGIONS: ICD-10-CM

## 2022-04-20 DIAGNOSIS — M99.03 SOMATIC DYSFUNCTION OF SPINE, LUMBAR: ICD-10-CM

## 2022-04-20 DIAGNOSIS — M53.3 COCCYDYNIA: Primary | ICD-10-CM

## 2022-04-20 DIAGNOSIS — M99.04 SOMATIC DYSFUNCTION OF SPINE, SACRAL: ICD-10-CM

## 2022-04-20 DIAGNOSIS — M99.05 SOMATIC DYSFUNCTION OF PELVIC REGION: ICD-10-CM

## 2022-04-20 DIAGNOSIS — M99.02 SOMATIC DYSFUNCTION OF SPINE, THORACIC: ICD-10-CM

## 2022-04-20 PROCEDURE — 99213 OFFICE O/P EST LOW 20 MIN: CPT | Performed by: FAMILY MEDICINE

## 2022-04-20 PROCEDURE — 98927 OSTEOPATH MANJ 5-6 REGIONS: CPT | Performed by: FAMILY MEDICINE

## 2022-04-26 NOTE — PROGRESS NOTES
"Chief Complaint  Follow-up (OMT) and Tailbone Pain (Current pain 3/10, worst pain 9/10)    Subjective          VARUN Meyers presents to [facility]  History of Present Illness  Here for follow up with mild to severe tailbone pain worse with prolonged sitting and transitions, otherwise pain is improved    Objective   Vital Signs:   /79 (BP Location: Left arm, Patient Position: Sitting, Cuff Size: Adult)   Pulse 81   Temp 97.4 °F (36.3 °C) (Temporal)   Ht 165.1 cm (65\")   Wt 89.8 kg (198 lb)   SpO2 99%   BMI 32.95 kg/m²       Physical Exam  Vitals and nursing note reviewed.   Constitutional:       General: She is not in acute distress.     Appearance: She is not diaphoretic.   HENT:      Head: Normocephalic and atraumatic.      Nose: Nose normal.   Eyes:      General: No scleral icterus.        Right eye: No discharge.         Left eye: No discharge.      Conjunctiva/sclera: Conjunctivae normal.   Neck:      Trachea: No tracheal deviation.   Pulmonary:      Effort: Pulmonary effort is normal.   Skin:     General: Skin is warm and dry.      Coloration: Skin is not pale.   Neurological:      Mental Status: She is alert and oriented to person, place, and time.   Psychiatric:         Behavior: Behavior normal.         Thought Content: Thought content normal.         Judgment: Judgment normal.      Osteopathic Structural Exam  Procedure Note for Osteopathic Manipulative Treatment    Pre-procedure diagnoses: Somatic dysfunctions as listed below.  Consent: Oral consent given for Osteopathic Treatment  Post-procedure diagnoses: same  Complications of procedure: none, patient tolerated procedure well    The evaluation including the history, physical exam and the management decisions, indicate than an appropriate intervention on this date of service is osteopathic manipulative treatment. Oral permission for the osteopathic procedure was obtained. The following treatment " methods and the responses for each body region are listed below.        Region Somatic Dysfunction Severity OMT technique Response      Thoracic  Thoracic inlet FSrRr Moderate  Balanced ligamentous tension  Improved       Lumbar L5 ERSR Moderate Balanced ligamentous tension Improved      Sacral Sacrococcygeal compression Moderate Balanced ligamentous tension Improved   Pelvic Pelvic diaphragm rotated L Moderate Balanced ligamentous tension Improved      Abdomen & Other Sites  falciform ligament fascial restriction Moderate  Myofascial Release Improved        Result Review :                 Assessment and Plan    Diagnoses and all orders for this visit:    1. Coccydynia (Primary)    2. Somatic dysfunction of spine, thoracic    3. Segmental and somatic dysfunction of abdomen and other regions    4. Somatic dysfunction of spine, lumbar    5. Somatic dysfunction of spine, sacral    6. Somatic dysfunction of pelvic region    OMT to balance autonomic tone, improve fascial symmetry and respiratory/circulatory/lymphatic compliance  F/u PRN

## 2022-05-24 ENCOUNTER — OFFICE VISIT (OUTPATIENT)
Dept: FAMILY MEDICINE CLINIC | Facility: CLINIC | Age: 62
End: 2022-05-24

## 2022-05-24 VITALS
HEIGHT: 65 IN | BODY MASS INDEX: 33.15 KG/M2 | HEART RATE: 80 BPM | SYSTOLIC BLOOD PRESSURE: 137 MMHG | TEMPERATURE: 98 F | DIASTOLIC BLOOD PRESSURE: 76 MMHG | OXYGEN SATURATION: 98 % | WEIGHT: 199 LBS

## 2022-05-24 DIAGNOSIS — M99.02 SOMATIC DYSFUNCTION OF SPINE, THORACIC: ICD-10-CM

## 2022-05-24 DIAGNOSIS — M54.50 CHRONIC MIDLINE LOW BACK PAIN, UNSPECIFIED WHETHER SCIATICA PRESENT: Primary | ICD-10-CM

## 2022-05-24 DIAGNOSIS — M99.04 SOMATIC DYSFUNCTION OF SPINE, SACRAL: ICD-10-CM

## 2022-05-24 DIAGNOSIS — M99.06 SOMATIC DYSFUNCTION OF LOWER EXTREMITY: ICD-10-CM

## 2022-05-24 DIAGNOSIS — M99.03 SOMATIC DYSFUNCTION OF SPINE, LUMBAR: ICD-10-CM

## 2022-05-24 DIAGNOSIS — G89.29 CHRONIC MIDLINE LOW BACK PAIN, UNSPECIFIED WHETHER SCIATICA PRESENT: Primary | ICD-10-CM

## 2022-05-24 DIAGNOSIS — M99.05 SOMATIC DYSFUNCTION OF PELVIC REGION: ICD-10-CM

## 2022-05-24 PROCEDURE — 99213 OFFICE O/P EST LOW 20 MIN: CPT | Performed by: FAMILY MEDICINE

## 2022-05-24 PROCEDURE — 98927 OSTEOPATH MANJ 5-6 REGIONS: CPT | Performed by: FAMILY MEDICINE

## 2022-05-26 NOTE — PROGRESS NOTES
"Chief Complaint  Follow-up (OMT today)    Subjective          VARUN Meyers presents to [facility]  History of Present Illness  Coccydynia greatly improved, mild aching back pain today, otherwise feels well    Objective   Vital Signs:  /76 (BP Location: Left arm, Patient Position: Sitting, Cuff Size: Adult)   Pulse 80   Temp 98 °F (36.7 °C) (Temporal)   Ht 165.1 cm (65\")   Wt 90.3 kg (199 lb)   SpO2 98%   BMI 33.12 kg/m²   BMI has not been calculated during today's encounter.       Physical Exam  Vitals and nursing note reviewed.   Constitutional:       General: She is not in acute distress.     Appearance: She is not diaphoretic.   HENT:      Head: Normocephalic and atraumatic.      Nose: Nose normal.   Eyes:      General: No scleral icterus.        Right eye: No discharge.         Left eye: No discharge.      Conjunctiva/sclera: Conjunctivae normal.   Neck:      Trachea: No tracheal deviation.   Pulmonary:      Effort: Pulmonary effort is normal.   Skin:     General: Skin is warm and dry.      Coloration: Skin is not pale.   Neurological:      Mental Status: She is alert and oriented to person, place, and time.   Psychiatric:         Behavior: Behavior normal.         Thought Content: Thought content normal.         Judgment: Judgment normal.      Osteopathic Structural Exam  Procedure Note for Osteopathic Manipulative Treatment    Pre-procedure diagnoses: Somatic dysfunctions as listed below.  Consent: Oral consent given for Osteopathic Treatment  Post-procedure diagnoses: same  Complications of procedure: none, patient tolerated procedure well    The evaluation including the history, physical exam and the management decisions, indicate than an appropriate intervention on this date of service is osteopathic manipulative treatment. Oral permission for the osteopathic procedure was obtained. The following treatment methods and the responses for each body region " are listed below.        Region Somatic Dysfunction Severity OMT technique Response      Thoracic  TL junction compression Moderate  Balanced ligamentous tension  Improved       Lumbar L5 ERSR Moderate Balanced ligamentous tension Improved      Sacral L SI compression Moderate Balanced ligamentous tension Improved   Pelvic R anterior rotation  L posterior rotation Moderate Balanced ligamentous tension Improved      Lower Extremities  b/l hip acetabular compression  Moderate  Balanced ligamentous tension Improved        Result Review :                 Assessment and Plan    Diagnoses and all orders for this visit:    1. Chronic midline low back pain, unspecified whether sciatica present (Primary)    2. Somatic dysfunction of spine, thoracic    3. Somatic dysfunction of spine, lumbar    4. Somatic dysfunction of spine, sacral    5. Somatic dysfunction of pelvic region    6. Somatic dysfunction of lower extremity    OMT to balance autonomic tone, improve fascial symmetry and respiratory/circulatory/lymphatic compliance  F/u PRN

## 2022-07-05 PROCEDURE — 87635 SARS-COV-2 COVID-19 AMP PRB: CPT | Performed by: NURSE PRACTITIONER

## 2022-07-25 ENCOUNTER — OFFICE VISIT (OUTPATIENT)
Dept: FAMILY MEDICINE CLINIC | Facility: CLINIC | Age: 62
End: 2022-07-25

## 2022-07-25 VITALS
OXYGEN SATURATION: 97 % | BODY MASS INDEX: 32.99 KG/M2 | HEIGHT: 65 IN | TEMPERATURE: 97.4 F | DIASTOLIC BLOOD PRESSURE: 80 MMHG | SYSTOLIC BLOOD PRESSURE: 131 MMHG | WEIGHT: 198 LBS | HEART RATE: 64 BPM

## 2022-07-25 DIAGNOSIS — R07.0 THROAT PAIN: Primary | ICD-10-CM

## 2022-07-25 DIAGNOSIS — Z83.71 FAMILY HISTORY OF POLYPS IN THE COLON: ICD-10-CM

## 2022-07-25 DIAGNOSIS — R11.14 BILIOUS VOMITING, UNSPECIFIED WHETHER NAUSEA PRESENT: ICD-10-CM

## 2022-07-25 DIAGNOSIS — J30.2 SEASONAL ALLERGIC RHINITIS, UNSPECIFIED TRIGGER: ICD-10-CM

## 2022-07-25 PROCEDURE — 99214 OFFICE O/P EST MOD 30 MIN: CPT | Performed by: FAMILY MEDICINE

## 2022-07-25 RX ORDER — PANTOPRAZOLE SODIUM 40 MG/1
40 TABLET, DELAYED RELEASE ORAL EVERY MORNING
Qty: 30 TABLET | Refills: 1 | Status: SHIPPED | OUTPATIENT
Start: 2022-07-25 | End: 2022-07-30 | Stop reason: CLARIF

## 2022-07-25 RX ORDER — FLUTICASONE PROPIONATE 50 MCG
SPRAY, SUSPENSION (ML) NASAL
Qty: 16 G | Refills: 2 | Status: SHIPPED | OUTPATIENT
Start: 2022-07-25 | End: 2022-10-03

## 2022-07-27 ENCOUNTER — TELEPHONE (OUTPATIENT)
Dept: FAMILY MEDICINE CLINIC | Facility: CLINIC | Age: 62
End: 2022-07-27

## 2022-07-27 DIAGNOSIS — K21.9 GASTROESOPHAGEAL REFLUX DISEASE, UNSPECIFIED WHETHER ESOPHAGITIS PRESENT: Primary | ICD-10-CM

## 2022-07-27 NOTE — TELEPHONE ENCOUNTER
Requesting lansoprazole which is preferred instead of pantoprazole that was sent to pharmacy. Please advise

## 2022-07-30 RX ORDER — LANSOPRAZOLE 30 MG/1
30 CAPSULE, DELAYED RELEASE ORAL EVERY MORNING
Qty: 30 CAPSULE | Refills: 5 | Status: SHIPPED | OUTPATIENT
Start: 2022-07-30

## 2022-08-11 DIAGNOSIS — G25.0 ESSENTIAL TREMOR: ICD-10-CM

## 2022-08-11 RX ORDER — PRIMIDONE 50 MG/1
TABLET ORAL
Qty: 90 TABLET | Refills: 0 | Status: SHIPPED | OUTPATIENT
Start: 2022-08-11 | End: 2022-10-20 | Stop reason: SDUPTHER

## 2022-08-17 DIAGNOSIS — R11.14 BILIOUS VOMITING, UNSPECIFIED WHETHER NAUSEA PRESENT: ICD-10-CM

## 2022-08-17 DIAGNOSIS — R07.0 THROAT PAIN: ICD-10-CM

## 2022-08-17 RX ORDER — PANTOPRAZOLE SODIUM 40 MG/1
40 TABLET, DELAYED RELEASE ORAL EVERY MORNING
Qty: 30 TABLET | Refills: 1 | OUTPATIENT
Start: 2022-08-17

## 2022-10-03 ENCOUNTER — TELEPHONE (OUTPATIENT)
Dept: FAMILY MEDICINE CLINIC | Facility: CLINIC | Age: 62
End: 2022-10-03

## 2022-10-03 ENCOUNTER — OFFICE VISIT (OUTPATIENT)
Dept: FAMILY MEDICINE CLINIC | Facility: CLINIC | Age: 62
End: 2022-10-03
Payer: COMMERCIAL

## 2022-10-03 VITALS
SYSTOLIC BLOOD PRESSURE: 132 MMHG | WEIGHT: 196.8 LBS | TEMPERATURE: 97 F | DIASTOLIC BLOOD PRESSURE: 79 MMHG | OXYGEN SATURATION: 96 % | HEIGHT: 65 IN | BODY MASS INDEX: 32.79 KG/M2 | HEART RATE: 74 BPM

## 2022-10-03 DIAGNOSIS — J02.9 PHARYNGITIS, UNSPECIFIED ETIOLOGY: ICD-10-CM

## 2022-10-03 DIAGNOSIS — H66.91 ACUTE RIGHT OTITIS MEDIA: Primary | ICD-10-CM

## 2022-10-03 DIAGNOSIS — H92.01 OTALGIA, RIGHT EAR: ICD-10-CM

## 2022-10-03 RX ORDER — AMOXICILLIN 500 MG/1
500 CAPSULE ORAL 3 TIMES DAILY
Qty: 21 CAPSULE | Refills: 0 | Status: SHIPPED | OUTPATIENT
Start: 2022-10-03 | End: 2022-10-04 | Stop reason: SDUPTHER

## 2022-10-04 DIAGNOSIS — J02.9 PHARYNGITIS, UNSPECIFIED ETIOLOGY: ICD-10-CM

## 2022-10-04 DIAGNOSIS — H66.91 ACUTE RIGHT OTITIS MEDIA: ICD-10-CM

## 2022-10-04 RX ORDER — AMOXICILLIN 500 MG/1
500 CAPSULE ORAL 3 TIMES DAILY
Qty: 21 CAPSULE | Refills: 0 | Status: SHIPPED | OUTPATIENT
Start: 2022-10-04 | End: 2022-10-12

## 2022-10-12 ENCOUNTER — OFFICE VISIT (OUTPATIENT)
Dept: FAMILY MEDICINE CLINIC | Facility: CLINIC | Age: 62
End: 2022-10-12

## 2022-10-12 VITALS
HEIGHT: 65 IN | HEART RATE: 73 BPM | WEIGHT: 196 LBS | DIASTOLIC BLOOD PRESSURE: 80 MMHG | BODY MASS INDEX: 32.65 KG/M2 | TEMPERATURE: 97.5 F | OXYGEN SATURATION: 97 % | SYSTOLIC BLOOD PRESSURE: 130 MMHG

## 2022-10-12 DIAGNOSIS — H66.001 RIGHT ACUTE SUPPURATIVE OTITIS MEDIA: Primary | ICD-10-CM

## 2022-10-12 DIAGNOSIS — R05.9 COUGH, UNSPECIFIED TYPE: ICD-10-CM

## 2022-10-12 DIAGNOSIS — R51.9 SINUS HEADACHE: ICD-10-CM

## 2022-10-12 DIAGNOSIS — H92.21 BLOOD IN EAR CANAL, RIGHT: ICD-10-CM

## 2022-10-12 PROCEDURE — 96372 THER/PROPH/DIAG INJ SC/IM: CPT | Performed by: FAMILY MEDICINE

## 2022-10-12 PROCEDURE — 99214 OFFICE O/P EST MOD 30 MIN: CPT | Performed by: FAMILY MEDICINE

## 2022-10-12 RX ORDER — METHYLPREDNISOLONE ACETATE 40 MG/ML
40 INJECTION, SUSPENSION INTRA-ARTICULAR; INTRALESIONAL; INTRAMUSCULAR; SOFT TISSUE ONCE
Status: COMPLETED | OUTPATIENT
Start: 2022-10-12 | End: 2022-10-12

## 2022-10-12 RX ORDER — BENZONATATE 200 MG/1
200 CAPSULE ORAL 3 TIMES DAILY PRN
Qty: 15 CAPSULE | Refills: 0 | Status: SHIPPED | OUTPATIENT
Start: 2022-10-12 | End: 2022-10-20

## 2022-10-12 RX ORDER — CIPROFLOXACIN 500 MG/1
500 TABLET, FILM COATED ORAL 2 TIMES DAILY
Qty: 14 TABLET | Refills: 0 | Status: SHIPPED | OUTPATIENT
Start: 2022-10-12 | End: 2022-10-20

## 2022-10-12 RX ADMIN — METHYLPREDNISOLONE ACETATE 40 MG: 40 INJECTION, SUSPENSION INTRA-ARTICULAR; INTRALESIONAL; INTRAMUSCULAR; SOFT TISSUE at 14:26

## 2022-10-20 ENCOUNTER — OFFICE VISIT (OUTPATIENT)
Dept: NEUROLOGY | Facility: CLINIC | Age: 62
End: 2022-10-20

## 2022-10-20 VITALS
DIASTOLIC BLOOD PRESSURE: 78 MMHG | HEIGHT: 65 IN | SYSTOLIC BLOOD PRESSURE: 132 MMHG | BODY MASS INDEX: 32.65 KG/M2 | WEIGHT: 196 LBS | HEART RATE: 87 BPM | OXYGEN SATURATION: 98 %

## 2022-10-20 DIAGNOSIS — G25.0 ESSENTIAL TREMOR: ICD-10-CM

## 2022-10-20 PROCEDURE — 99214 OFFICE O/P EST MOD 30 MIN: CPT | Performed by: PSYCHIATRY & NEUROLOGY

## 2022-10-20 RX ORDER — PRIMIDONE 50 MG/1
100 TABLET ORAL NIGHTLY
Qty: 60 TABLET | Refills: 5 | Status: SHIPPED | OUTPATIENT
Start: 2022-10-20 | End: 2023-04-18

## 2022-10-20 NOTE — PROGRESS NOTES
"Chief Complaint  Tremors    Subjective        VARUN Meyers presents to [facility]    History of Present Illness  63 yo here for f/u of tremor. Primidone was working, tremor worse in the last few months. Worse on left and when doing things like typing. Denies any sleepiness from the primidone.       Past Medical History:   Diagnosis Date   • Colon polyp 5 Years ago   • Depression    • Diverticulosis 5 years ago   • Family history of colonic polyps    • History of colon polyps    • Low back pain    • Obesity    • Seasonal allergies    • Sleep apnea     Never officially tested   • Tremor           Current Outpatient Medications:   •  cetirizine (zyrTEC) 10 MG tablet, Take 10 mg by mouth Daily., Disp: , Rfl:   •  ibuprofen (ADVIL,MOTRIN) 200 MG tablet, Take 200 mg by mouth Every 6 (Six) Hours As Needed., Disp: , Rfl:   •  lansoprazole (Prevacid) 30 MG capsule, Take 1 capsule by mouth Every Morning. 30 min AC to control heartburn, Disp: 30 capsule, Rfl: 5  •  primidone (MYSOLINE) 50 MG tablet, TAKE 1 TABLET BY MOUTH EVERY DAY AT NIGHT (Patient taking differently: Take 1 tablet by mouth Every Night.), Disp: 90 tablet, Rfl: 0  •  sertraline (ZOLOFT) 25 MG tablet, Take 1 tablet by mouth Daily. Along with the 50 mg sertraline for mood.  Take with food., Disp: 90 tablet, Rfl: 1  •  sertraline (ZOLOFT) 50 MG tablet, Take 1 tablet by mouth Daily. Along with the 25 mg sertraline pill for mood.  Take with food., Disp: 90 tablet, Rfl: 1       Objective   Vital Signs:   /78   Pulse 87   Ht 165.1 cm (65\")   Wt 88.9 kg (196 lb)   SpO2 98%   BMI 32.62 kg/m²     Physical Exam  Constitutional:       General: She is awake.   Eyes:      Extraocular Movements: Extraocular movements intact.      Pupils: Pupils are equal, round, and reactive to light.   Neurological:      Mental Status: She is alert.      Deep Tendon Reflexes: Reflexes are normal and symmetric.   Psychiatric:         Speech: " Speech normal.          Result Review :                     Assessment and Plan   61-year-old female with essential tremor.  She was not able to tolerate Topamax.   Started on primidone and getting better.      Plan:    1.  Increase primidone to 100 mg qhs. She will give an update in a few weeks and we can increase further if needed.   2.  Follow-up 3 months.        Follow Up   No follow-ups on file.  Patient was given instructions and counseling regarding her condition or for health maintenance advice. Please see specific information pulled into the AVS if appropriate.

## 2022-11-07 ENCOUNTER — OFFICE VISIT (OUTPATIENT)
Dept: FAMILY MEDICINE CLINIC | Facility: CLINIC | Age: 62
End: 2022-11-07

## 2022-11-07 VITALS
SYSTOLIC BLOOD PRESSURE: 130 MMHG | OXYGEN SATURATION: 96 % | HEIGHT: 65 IN | TEMPERATURE: 97 F | BODY MASS INDEX: 32.69 KG/M2 | HEART RATE: 67 BPM | WEIGHT: 196.2 LBS | DIASTOLIC BLOOD PRESSURE: 74 MMHG

## 2022-11-07 DIAGNOSIS — N95.2 POSTMENOPAUSAL ATROPHIC VAGINITIS: ICD-10-CM

## 2022-11-07 DIAGNOSIS — Z12.31 ENCOUNTER FOR SCREENING MAMMOGRAM FOR MALIGNANT NEOPLASM OF BREAST: ICD-10-CM

## 2022-11-07 DIAGNOSIS — Z12.39 ENCOUNTER FOR SCREENING BREAST EXAMINATION AND DISCUSSION OF BREAST SELF EXAMINATION: ICD-10-CM

## 2022-11-07 DIAGNOSIS — N89.5 STENOSIS OF VAGINA: ICD-10-CM

## 2022-11-07 DIAGNOSIS — Z01.419 WELL WOMAN EXAM WITH ROUTINE GYNECOLOGICAL EXAM: Primary | ICD-10-CM

## 2022-11-07 DIAGNOSIS — Z80.3 FAMILY HISTORY OF BREAST CANCER IN MOTHER: ICD-10-CM

## 2022-11-07 DIAGNOSIS — R05.3 CHRONIC COUGHING: ICD-10-CM

## 2022-11-07 PROCEDURE — 99396 PREV VISIT EST AGE 40-64: CPT | Performed by: FAMILY MEDICINE

## 2022-11-07 RX ORDER — MONTELUKAST SODIUM 10 MG/1
10 TABLET ORAL NIGHTLY
Qty: 30 TABLET | Refills: 2 | Status: SHIPPED | OUTPATIENT
Start: 2022-11-07 | End: 2023-02-06

## 2022-11-07 NOTE — PROGRESS NOTES
Chief Complaint   Patient presents with   • Annual Exam     Well woman exam   • Gynecologic Exam       History:  VARUN Meyers is a 62 y.o. female who presents today for GYN evaluation.  - established patient.      History of abnormal Pap smear: no  History of abnormal mammogram: no  Exercise: rarely      Prevacid has been started again.  Had bad episode of coughing last night.  Had been off it for a month without GERD but still had cough.  No change in cough with or without the prevacid.      Gets laughing and then has coughing paroxysm.  No clogging in throat before hand.    Went to [place] and still had cough so environment doesn't matter.    R ear was worse on exam in early Oct when she also had ear pain, sore throat.      VARUN  reports that she is not currently sexually active and has had partner(s) who are male. She reports using the following method of birth control/protection: Post-menopausal.       Breast concerns: none  Pelvic concerns: none  Menopause/menstrual concerns: weight gain around middle      Reports/chart reviewed:  Last Completed Mammogram          Scheduled - MAMMOGRAM (Every 2 Years) Scheduled for 11/29/2022    10/01/2021  Done - normal.  Mother had breast cancer               Mom was in her late 60's when she got breast cancer.      -Last DEXA: never had one,  Neg fam h/o osteoporosis.      ROS:  Review of Systems   HENT: Negative for trouble swallowing.    Respiratory: Positive for cough. Negative for wheezing.    Genitourinary: Negative for difficulty urinating.   Allergic/Immunologic: Negative for environmental allergies and food allergies.       Allergies   Allergen Reactions   • Morphine Itching and Swelling          • Topamax [Topiramate] Confusion     Brain fog, increased somnolence   • Flonase [Fluticasone] Other (See Comments)     Blisters around nose       Past Medical History:   Diagnosis Date   • Colon polyp 5 Years ago   • Depression    • Diverticulosis 5 years  ago   • Family history of colonic polyps    • History of colon polyps    • Low back pain    • Obesity    • Seasonal allergies    • Sleep apnea     Never officially tested   • Tremor        Past Surgical History:   Procedure Laterality Date   •  SECTION      X 2   • COLONOSCOPY  5 years ago    I have one scheduled for Fri, 3/18   • COLONOSCOPY N/A 2022    Procedure: COLONOSCOPY WITH ANESTHESIA;  Surgeon: Keysha Disla MD;  [facility]: [facility];  Service: Gastroenterology;  Laterality: N/A;  pre screen  post diverticulosis,polyp  Dr. Garcia   • MOLE REMOVAL      From nose       Family History   Problem Relation Age of Onset   • Breast cancer Mother    • Migraines Mother    • Cancer Mother    • Diabetes type II Father    • Heart disease Father    • Stroke Father             • COPD Father    • Diabetes Father    • Hearing loss Father         Hearing issues on my father's side of the family   • Colon polyps Maternal Uncle         Unknown age   • Alcohol abuse Maternal Uncle    • Drug abuse Maternal Uncle    • Thyroid disease Maternal Grandmother    • Dementia Maternal Grandmother    • Hearing loss Sister         tumor   • Multiple sclerosis Sister    • Colon cancer Neg Hx    • Esophageal cancer Neg Hx    • Liver disease Neg Hx    • Ulcerative colitis Neg Hx    • Rectal cancer Neg Hx    • Stomach cancer Neg Hx        VARUN  reports that she quit smoking about 22 years ago. Her smoking use included cigarettes. She started smoking about 45 years ago. She has a 10.00 pack-year smoking history. She has never used smokeless tobacco. She reports current alcohol use. She reports that she does not use drugs.    Outpatient Medications Prior to Visit   Medication Sig Dispense Refill   • cetirizine (zyrTEC) 10 MG tablet Take 10 mg by mouth Daily.     • ibuprofen (ADVIL,MOTRIN) 200 MG tablet Take 200 mg by mouth Every 6 (Six) Hours As Needed.     • lansoprazole (Prevacid) 30 MG capsule Take 1  "capsule by mouth Every Morning. 30 min AC to control heartburn 30 capsule 5   • primidone (MYSOLINE) 50 MG tablet Take 2 tablets by mouth Every Night for 180 days. 60 tablet 5   • sertraline (ZOLOFT) 25 MG tablet Take 1 tablet by mouth Daily. Along with the 50 mg sertraline for mood.  Take with food. 90 tablet 1   • sertraline (ZOLOFT) 50 MG tablet Take 1 tablet by mouth Daily. Along with the 25 mg sertraline pill for mood.  Take with food. 90 tablet 1     No facility-administered medications prior to visit.    Uses allergy eye drops occasionally.    OBJECTIVE:  /74 (BP Location: Left arm, Patient Position: Sitting, Cuff Size: Adult)   Pulse 67   Temp 97 °F (36.1 °C) (Temporal)   Ht 165.1 cm (65\")   Wt 89 kg (196 lb 3.2 oz)   SpO2 96%   BMI 32.65 kg/m²      Physical Exam    Physical Exam  Vitals reviewed. Exam conducted with a chaperone present.   Constitutional:       General: She is not in acute distress.     Appearance: Obese appearance. She is not ill-appearing.   Cardiovascular:      Rate and Rhythm: Normal rate and regular rhythm.   Pulmonary:      Effort: Pulmonary effort is normal.      Breath sounds: Normal breath sounds.   Abdominal:      General: Bowel sounds are normal. There is no distension.      Palpations: Abdomen is soft. There is no mass.      Hernia: There is no hernia in the left inguinal area or right inguinal area.   Genitourinary:     General: Normal vulva.      Exam position: Lithotomy position.      Pubic Area: No rash.       Labia:         Right: No rash or lesion.         Left: No rash or lesion.       Urethra: No urethral swelling or urethral lesion.      Vagina: Unable to access with speculum due to pt's pain on attempted insertion and trying to open the speculum.  Finger exam suggestive of a tight intravaginal ring of tissue which is passable and in the upper vagina she is more open but cervix cannot be palpated or located     Cervix: Not seen or palpated, may be higher than " accessible today     Uterus: No pelvic masses or obvious uterine enlargement on palpation.     Adnexa: Right adnexa normal and left adnexa normal.      Rectum: No external hemorrhoid.   Musculoskeletal:      Right lower leg: No edema.      Left lower leg: No edema.      Comments: Gen ARMSTRONG, changes positions easily.   Lymphadenopathy:      Lower Body: No right inguinal adenopathy. No left inguinal adenopathy.      Axillae: no adenopathy.  Breasts:  Normal breast exam B: no rashes, no dimples, no nipple or areolar abnormalities.  B symmetric breasts without concerning nodules or tenderness; discussed home breast exam and what to look for.  Skin:     General: Skin is warm.  No suspicious lesions.     Findings: No erythema or rash.   Neurological:      Mental Status: She is alert and oriented to person, place, and time.       A Pap was not obtained at this visit.  HPV testing was not done.    Assessment/Plan  Diagnoses and all orders for this visit:    1. Well woman exam with routine gynecological exam (Primary)    2. Stenosis of vagina  -     Ambulatory Referral to Obstetrics / Gynecology    3. Postmenopausal atrophic vaginitis  -     Ambulatory Referral to Obstetrics / Gynecology    4. Encounter for screening breast examination and discussion of breast self examination    5. Encounter for screening mammogram for malignant neoplasm of breast  -     Mammo Screening Digital Tomosynthesis Bilateral With CAD; Future    6. Family history of breast cancer in mother  -     Mammo Screening Digital Tomosynthesis Bilateral With CAD; Future    7. Chronic coughing  -     montelukast (Singulair) 10 MG tablet; Take 1 tablet by mouth Every Night. For control of histamine and cough  Dispense: 30 tablet; Refill: 2       I discussed my findings on physical exam with the patient today and recommend further evaluation by OB/GYN before pursuing Pap testing/cervical cancer screening.  May need US there or exam with different  approach/equipment.    After Visit Summary was completed today for the patient.      Pt will track cough and see if it's any different with the Singulair use or with the further out she gets from her acute illness.       Maegan Garcia M.D.  Family Medicine   [facility]

## 2022-11-16 ENCOUNTER — APPOINTMENT (OUTPATIENT)
Dept: MAMMOGRAPHY | Facility: HOSPITAL | Age: 62
End: 2022-11-16

## 2022-11-29 ENCOUNTER — HOSPITAL ENCOUNTER (OUTPATIENT)
Dept: MAMMOGRAPHY | Facility: HOSPITAL | Age: 62
Discharge: HOME | End: 2022-11-29
Admitting: FAMILY MEDICINE

## 2022-11-29 DIAGNOSIS — Z12.31 ENCOUNTER FOR SCREENING MAMMOGRAM FOR MALIGNANT NEOPLASM OF BREAST: ICD-10-CM

## 2022-11-29 DIAGNOSIS — Z80.3 FAMILY HISTORY OF BREAST CANCER IN MOTHER: ICD-10-CM

## 2022-11-29 PROCEDURE — 77067 SCR MAMMO BI INCL CAD: CPT

## 2022-11-29 PROCEDURE — 77063 BREAST TOMOSYNTHESIS BI: CPT

## 2022-12-15 ENCOUNTER — OFFICE VISIT (OUTPATIENT)
Dept: OBSTETRICS AND GYNECOLOGY | Facility: CLINIC | Age: 62
End: 2022-12-15

## 2022-12-15 VITALS
SYSTOLIC BLOOD PRESSURE: 124 MMHG | WEIGHT: 197 LBS | DIASTOLIC BLOOD PRESSURE: 78 MMHG | BODY MASS INDEX: 32.82 KG/M2 | HEIGHT: 65 IN

## 2022-12-15 DIAGNOSIS — Z12.4 CERVICAL CANCER SCREENING: Primary | ICD-10-CM

## 2022-12-15 PROCEDURE — 87624 HPV HI-RISK TYP POOLED RSLT: CPT | Performed by: OBSTETRICS & GYNECOLOGY

## 2022-12-15 PROCEDURE — G0123 SCREEN CERV/VAG THIN LAYER: HCPCS | Performed by: OBSTETRICS & GYNECOLOGY

## 2022-12-15 PROCEDURE — 99203 OFFICE O/P NEW LOW 30 MIN: CPT | Performed by: OBSTETRICS & GYNECOLOGY

## 2022-12-15 NOTE — PROGRESS NOTES
Subjective   VARUN Meyers is a 62 y.o. female  YOB: 1960    Chief Complaint   Patient presents with   • Vaginal Pain     New patient here today referred by Dr Garcia due to vaginal pain with exam at her office. Patient had yearly exam last year in [place] and was normal per patient. Patient was not able to have pap smear done with Dr Garcia recently. Patient denies pelvic pain or abnormal discharge.        62 year old female  LMP  presents due to pain with examination when she was having yearly with PCP. She denies any postmenopausal bleeding. She denies any cramping. She denies any hot flashes or night sweats. She reports that her last pap smear was a little over a year ago. Her medical and surgical history are up to date. She reports ETOH. She reports family history of breast cancer diagnosed at age 67. She is not sexually active and does not plan to become sexually active.       Allergies   Allergen Reactions   • Morphine Itching and Swelling          • Topamax [Topiramate] Confusion     Brain fog, increased somnolence   • Flonase [Fluticasone] Other (See Comments)     Blisters around nose       Past Medical History:   Diagnosis Date   • Colon polyp 5 Years ago   • Depression    • Diverticulosis 5 years ago   • Family history of colonic polyps    • History of colon polyps    • Low back pain    • Obesity    • Seasonal allergies    • Sleep apnea     Never officially tested   • Tremor        Family History   Problem Relation Age of Onset   • Diabetes type II Father    • Heart disease Father    • Stroke Father             • COPD Father    • Diabetes Father    • Hearing loss Father         Hearing issues on my father's side of the family   • Breast cancer Mother    • Migraines Mother    • Cancer Mother    • Melanoma Sister    • Hearing loss Sister         tumor   • Multiple sclerosis Sister    • Thyroid disease Maternal Grandmother    • Dementia Maternal Grandmother    • Colon  polyps Maternal Uncle         Unknown age   • Alcohol abuse Maternal Uncle    • Drug abuse Maternal Uncle    • Colon cancer Neg Hx    • Esophageal cancer Neg Hx    • Liver disease Neg Hx    • Ulcerative colitis Neg Hx    • Rectal cancer Neg Hx    • Stomach cancer Neg Hx    • Ovarian cancer Neg Hx    • Uterine cancer Neg Hx        Social History     Socioeconomic History   • Marital status:    Tobacco Use   • Smoking status: Former     Packs/day: 0.50     Years: 20.00     Pack years: 10.00     Types: Cigarettes     Start date: 1977     Quit date: 1999     Years since quittin.0   • Smokeless tobacco: Never   • Tobacco comments:        Vaping Use   • Vaping Use: Never used   Substance and Sexual Activity   • Alcohol use: Yes     Comment: Drink every evening, amounts and type vary.   • Drug use: Never   • Sexual activity: Not Currently     Partners: Male     Birth control/protection: Post-menopausal         Current Outpatient Medications:   •  cetirizine (zyrTEC) 10 MG tablet, Take 10 mg by mouth Daily., Disp: , Rfl:   •  ibuprofen (ADVIL,MOTRIN) 200 MG tablet, Take 200 mg by mouth Every 6 (Six) Hours As Needed., Disp: , Rfl:   •  lansoprazole (Prevacid) 30 MG capsule, Take 1 capsule by mouth Every Morning. 30 min AC to control heartburn, Disp: 30 capsule, Rfl: 5  •  montelukast (Singulair) 10 MG tablet, Take 1 tablet by mouth Every Night. For control of histamine and cough, Disp: 30 tablet, Rfl: 2  •  primidone (MYSOLINE) 50 MG tablet, Take 2 tablets by mouth Every Night for 180 days., Disp: 60 tablet, Rfl: 5  •  sertraline (ZOLOFT) 25 MG tablet, Take 1 tablet by mouth Daily. Along with the 50 mg sertraline for mood.  Take with food., Disp: 90 tablet, Rfl: 1  •  sertraline (ZOLOFT) 50 MG tablet, Take 1 tablet by mouth Daily. Along with the 25 mg sertraline pill for mood.  Take with food., Disp: 90 tablet, Rfl: 1    No LMP recorded. Patient is postmenopausal.    Sexual History:         Could  "not be calculated    Past Surgical History:   Procedure Laterality Date   •  SECTION      X 2   • COLONOSCOPY  5 years ago    I have one scheduled for Fri, 3/18   • COLONOSCOPY N/A 2022    Procedure: COLONOSCOPY WITH ANESTHESIA;  Surgeon: Keysha Disla MD;  Location: [facility] ENDOSCOPY;  Service: Gastroenterology;  Laterality: N/A;  pre screen  post diverticulosis,polyp  Dr. Garcia   • DENTAL PROCEDURE      implants x 2   • MOLE REMOVAL      From nose       Review of Systems   Genitourinary: Negative for pelvic pain, vaginal bleeding and vaginal discharge.       Objective   Physical Exam  Vitals and nursing note reviewed. Exam conducted with a chaperone present.   Constitutional:       General: She is not in acute distress.     Appearance: She is well-developed.   HENT:      Head: Normocephalic and atraumatic.   Pulmonary:      Effort: Pulmonary effort is normal.   Abdominal:      Palpations: Abdomen is soft.      Tenderness: There is no abdominal tenderness.   Genitourinary:     Exam position: Supine.      Labia:         Right: No tenderness or lesion.         Left: No tenderness or lesion.       Vagina: Normal. No signs of injury. No vaginal discharge, tenderness or bleeding.      Cervix: No cervical motion tenderness, discharge or friability.      Uterus: Not enlarged and not tender.       Adnexa:         Right: No tenderness or fullness.          Left: No tenderness or fullness.        Comments: Small speculum used. Vaginal atrophy noted.           Vitals:    12/15/22 1016   BP: 124/78   Weight: 89.4 kg (197 lb)   Height: 165.1 cm (65\")       Diagnoses and all orders for this visit:    1. Cervical cancer screening (Primary)  -     Liquid-based Pap Smear, Screening  -     HPV DNA Probe, Direct - ThinPrep Vial, Cervix    PAP smear collected today   RTC In 1 year or sooner for annual examination or sooner if symptoms worsen,.     Aurelia Daiz, DO       "

## 2022-12-19 LAB
GEN CATEG CVX/VAG CYTO-IMP: NORMAL
HPV I/H RISK 4 DNA CVX QL PROBE+SIG AMP: NOT DETECTED
LAB AP CASE REPORT: NORMAL
LAB AP GYN ADDITIONAL INFORMATION: NORMAL
LAB AP GYN OTHER FINDINGS: NORMAL
Lab: NORMAL
PATH INTERP SPEC-IMP: NORMAL
STAT OF ADQ CVX/VAG CYTO-IMP: NORMAL

## 2022-12-30 DIAGNOSIS — F32.A DEPRESSIVE DISORDER: ICD-10-CM

## 2022-12-30 RX ORDER — SERTRALINE HYDROCHLORIDE 25 MG/1
25 TABLET, FILM COATED ORAL DAILY
Qty: 90 TABLET | Refills: 1 | Status: SHIPPED | OUTPATIENT
Start: 2022-12-30 | End: 2023-01-19 | Stop reason: SDUPTHER

## 2023-01-19 ENCOUNTER — OFFICE VISIT (OUTPATIENT)
Dept: NEUROLOGY | Facility: CLINIC | Age: 63
End: 2023-01-19
Payer: COMMERCIAL

## 2023-01-19 VITALS
HEART RATE: 71 BPM | WEIGHT: 197 LBS | BODY MASS INDEX: 32.82 KG/M2 | DIASTOLIC BLOOD PRESSURE: 74 MMHG | OXYGEN SATURATION: 98 % | SYSTOLIC BLOOD PRESSURE: 122 MMHG | HEIGHT: 65 IN

## 2023-01-19 DIAGNOSIS — F32.A DEPRESSIVE DISORDER: ICD-10-CM

## 2023-01-19 DIAGNOSIS — G25.0 ESSENTIAL TREMOR: Primary | ICD-10-CM

## 2023-01-19 PROCEDURE — 99214 OFFICE O/P EST MOD 30 MIN: CPT | Performed by: PSYCHIATRY & NEUROLOGY

## 2023-01-19 RX ORDER — SERTRALINE HYDROCHLORIDE 25 MG/1
25 TABLET, FILM COATED ORAL DAILY
Qty: 90 TABLET | Refills: 1 | Status: SHIPPED | OUTPATIENT
Start: 2023-01-19 | End: 2023-03-23 | Stop reason: SDUPTHER

## 2023-01-19 NOTE — PROGRESS NOTES
"Chief Complaint  Tremors (Patient states an improvement in bilateral hand tremors since increasing Primidone.)    Subjective        VARUN Meyers presents to [facility]    History of Present Illness  63 yo here for f/u of tremor. Primidone working well. Tremor is much better. Missed a dose once and could feel it.       Past Medical History:   Diagnosis Date   • Colon polyp 5 Years ago   • Depression    • Diverticulosis 5 years ago   • Family history of colonic polyps    • History of colon polyps    • Low back pain    • Obesity    • Seasonal allergies    • Sleep apnea     Never officially tested   • Tremor           Current Outpatient Medications:   •  cetirizine (zyrTEC) 10 MG tablet, Take 10 mg by mouth Daily., Disp: , Rfl:   •  ibuprofen (ADVIL,MOTRIN) 200 MG tablet, Take 200 mg by mouth Every 6 (Six) Hours As Needed., Disp: , Rfl:   •  lansoprazole (Prevacid) 30 MG capsule, Take 1 capsule by mouth Every Morning. 30 min AC to control heartburn, Disp: 30 capsule, Rfl: 5  •  montelukast (Singulair) 10 MG tablet, Take 1 tablet by mouth Every Night. For control of histamine and cough, Disp: 30 tablet, Rfl: 2  •  primidone (MYSOLINE) 50 MG tablet, Take 2 tablets by mouth Every Night for 180 days., Disp: 60 tablet, Rfl: 5  •  sertraline (ZOLOFT) 25 MG tablet, TAKE 1 TABLET BY MOUTH DAILY. ALONG WITH THE 50 MG SERTRALINE FOR MOOD. TAKE WITH FOOD., Disp: 90 tablet, Rfl: 1  •  sertraline (ZOLOFT) 50 MG tablet, TAKE 1 TABLET BY MOUTH DAILY. ALONG WITH THE 25 MG SERTRALINE PILL FOR MOOD. TAKE WITH FOOD., Disp: 90 tablet, Rfl: 1       Objective   Vital Signs:   /74 (BP Location: Left arm, Patient Position: Sitting, Cuff Size: Large Adult)   Pulse 71   Ht 165.1 cm (65\")   Wt 89.4 kg (197 lb)   SpO2 98%   BMI 32.78 kg/m²     Physical Exam  Constitutional:       General: She is awake.   Eyes:      Extraocular Movements: Extraocular movements intact.      Pupils: Pupils are equal, " round, and reactive to light.   Neurological:      Mental Status: She is alert.      Deep Tendon Reflexes: Reflexes are normal and symmetric.   Psychiatric:         Speech: Speech normal.        Minimal low amplitude postural tremor with arms outstretched    Result Review :                     Assessment and Plan   62-year-old female with essential tremor.  She was not able to tolerate Topamax.   Good response from primidone.     Plan:    1.  Continue primidone 100 mg qhs.   2.  F/u 1 year.       Follow Up   No follow-ups on file.  Patient was given instructions and counseling regarding her condition or for health maintenance advice. Please see specific information pulled into the AVS if appropriate.

## 2023-02-05 DIAGNOSIS — R05.3 CHRONIC COUGHING: ICD-10-CM

## 2023-02-06 RX ORDER — MONTELUKAST SODIUM 10 MG/1
TABLET ORAL
Qty: 90 TABLET | Refills: 1 | Status: SHIPPED | OUTPATIENT
Start: 2023-02-06

## 2023-03-23 DIAGNOSIS — F32.A DEPRESSIVE DISORDER: ICD-10-CM

## 2023-03-23 RX ORDER — SERTRALINE HYDROCHLORIDE 25 MG/1
25 TABLET, FILM COATED ORAL DAILY
Qty: 90 TABLET | Refills: 0 | Status: SHIPPED | OUTPATIENT
Start: 2023-03-23

## 2023-03-23 NOTE — TELEPHONE ENCOUNTER
Rx Refill Note  Requested Prescriptions     Pending Prescriptions Disp Refills   • sertraline (ZOLOFT) 25 MG tablet 90 tablet 0     Sig: Take 1 tablet by mouth Daily. Along with the 50 mg sertraline for mood.  Take with food.   • sertraline (ZOLOFT) 50 MG tablet 90 tablet 0     Sig: Take 1 tablet by mouth Daily. Along with the 25 mg sertraline pill for mood.  Take with food.      Last office visit with prescribing clinician: 5/24/2022   Last telemedicine visit with prescribing clinician: 4/25/2023   Next office visit with prescribing clinician: 4/25/2023                         Would you like a call back once the refill request has been completed: [] Yes [] No    If the office needs to give you a call back, can they leave a voicemail: [] Yes [] No    MARY Colon  03/23/23, 14:41 CDT       Previous Dr Garcia patient, has establish care visit with Dr. Edward on 4/25/23.  Refills sent per office protocol

## 2023-04-25 ENCOUNTER — OFFICE VISIT (OUTPATIENT)
Dept: FAMILY MEDICINE CLINIC | Facility: CLINIC | Age: 63
End: 2023-04-25
Payer: COMMERCIAL

## 2023-04-25 VITALS
SYSTOLIC BLOOD PRESSURE: 132 MMHG | TEMPERATURE: 98 F | HEIGHT: 65 IN | DIASTOLIC BLOOD PRESSURE: 80 MMHG | OXYGEN SATURATION: 96 % | WEIGHT: 194.7 LBS | HEART RATE: 78 BPM | BODY MASS INDEX: 32.44 KG/M2

## 2023-04-25 DIAGNOSIS — F32.A DEPRESSIVE DISORDER: ICD-10-CM

## 2023-04-25 DIAGNOSIS — Z00.00 ANNUAL PHYSICAL EXAM: Primary | ICD-10-CM

## 2023-04-25 DIAGNOSIS — G47.33 OSA (OBSTRUCTIVE SLEEP APNEA): ICD-10-CM

## 2023-04-25 DIAGNOSIS — E66.9 OBESITY (BMI 30-39.9): ICD-10-CM

## 2023-04-25 RX ORDER — SEMAGLUTIDE 0.25 MG/.5ML
0.25 INJECTION, SOLUTION SUBCUTANEOUS WEEKLY
Qty: 2 ML | Refills: 0 | Status: SHIPPED | OUTPATIENT
Start: 2023-04-25

## 2023-04-25 NOTE — PROGRESS NOTES
Chief Complaint  Annual Exam (Requesting lab work, epworth completed today, ESS=19)    Subjective        VARUN Meyers presents to [facility]  History of Present Illness  Here for annual exam  High epworth scale noted above  Mood stable,interestedin weight loss  ROS otherwise neg    Current Outpatient Medications   Medication Instructions   • cetirizine (ZYRTEC) 10 mg, Oral, Daily   • ibuprofen (ADVIL,MOTRIN) 200 mg, Oral, Every 6 Hours PRN   • lansoprazole (PREVACID) 30 mg, Oral, Every Morning, 30 min AC to control heartburn   • montelukast (SINGULAIR) 10 MG tablet TAKE 1 TABLET BY MOUTH NIGHTLY   • ondansetron ODT (ZOFRAN-ODT) 4 mg, Translingual, Every 8 Hours PRN   • primidone (MYSOLINE) 100 mg, Oral, Nightly   • sertraline (ZOLOFT) 25 mg, Oral, Daily, Along with the 50 mg sertraline for mood.  Take with food.   • sertraline (ZOLOFT) 50 mg, Oral, Daily, Along with the 25 mg sertraline pill for mood.  Take with food.   • Wegovy 0.25 mg, Subcutaneous, Weekly     Past Medical History:   Diagnosis Date   • Colon polyp 5 Years ago   • Depression    • Diverticulosis 5 years ago   • Family history of colonic polyps    • History of colon polyps    • Low back pain    • Obesity    • Seasonal allergies    • Sleep apnea     Never officially tested   • Tremor      Past Surgical History:   Procedure Laterality Date   •  SECTION      X 2   • COLONOSCOPY  5 years ago    I have one scheduled for Fri, 3/18   • COLONOSCOPY N/A 2022    Procedure: COLONOSCOPY WITH ANESTHESIA;  Surgeon: Keysha Disla MD;  Location: [facility] ENDOSCOPY;  Service: Gastroenterology;  Laterality: N/A;  pre screen  post diverticulosis,polyp  Dr. Garcia   • DENTAL PROCEDURE      implants x 2   • MOLE REMOVAL      From nose     Allergies   Allergen Reactions   • Morphine Itching and Swelling          • Topamax [Topiramate] Confusion     Brain fog, increased somnolence   • Flonase [Fluticasone] Other (See  "Comments)     Blisters around nose         Objective   Vital Signs:  /80 (BP Location: Left arm, Patient Position: Sitting, Cuff Size: Adult)   Pulse 78   Temp 98 °F (36.7 °C) (Temporal)   Ht 165.1 cm (65\")   Wt 88.3 kg (194 lb 11.2 oz)   SpO2 96%   BMI 32.40 kg/m²   Estimated body mass index is 32.4 kg/m² as calculated from the following:    Height as of this encounter: 165.1 cm (65\").    Weight as of this encounter: 88.3 kg (194 lb 11.2 oz).             Physical Exam  Vitals and nursing note reviewed.   Constitutional:       General: She is not in acute distress.     Appearance: Normal appearance. She is not ill-appearing or diaphoretic.   HENT:      Head: Normocephalic and atraumatic.      Right Ear: Tympanic membrane and external ear normal.      Left Ear: Tympanic membrane and external ear normal.      Nose: Nose normal. No congestion or rhinorrhea.      Mouth/Throat:      Mouth: Mucous membranes are moist.      Pharynx: Oropharynx is clear. No oropharyngeal exudate or posterior oropharyngeal erythema.   Eyes:      General: No scleral icterus.        Right eye: No discharge.         Left eye: No discharge.      Extraocular Movements: Extraocular movements intact.      Conjunctiva/sclera: Conjunctivae normal.      Pupils: Pupils are equal, round, and reactive to light.   Neck:      Thyroid: No thyromegaly.      Vascular: No JVD.      Trachea: No tracheal deviation.   Cardiovascular:      Rate and Rhythm: Normal rate and regular rhythm.      Pulses: Normal pulses.      Heart sounds: Normal heart sounds. No murmur heard.    No friction rub. No gallop.   Pulmonary:      Effort: Pulmonary effort is normal.      Breath sounds: Normal breath sounds.   Abdominal:      General: Bowel sounds are normal. There is no distension.      Palpations: Abdomen is soft. There is no mass.      Tenderness: There is no abdominal tenderness. There is no guarding or rebound.   Musculoskeletal:         General: No deformity " or signs of injury.      Cervical back: Neck supple.      Right lower leg: No edema.      Left lower leg: No edema.   Lymphadenopathy:      Cervical: No cervical adenopathy.   Skin:     General: Skin is warm and dry.      Capillary Refill: Capillary refill takes less than 2 seconds.      Coloration: Skin is not jaundiced or pale.   Neurological:      Mental Status: She is alert and oriented to person, place, and time. Mental status is at baseline.   Psychiatric:         Mood and Affect: Mood normal.         Behavior: Behavior normal.         Thought Content: Thought content normal.         Judgment: Judgment normal.        Result Review :                   Assessment and Plan   Diagnoses and all orders for this visit:    1. Annual physical exam (Primary)  -     CBC No Differential; Future  -     Comprehensive Metabolic Panel; Future  -     Lipid panel; Future    2. BRIAN (obstructive sleep apnea)  -     Home Sleep Study; Future    3. Depressive disorder    4. Obesity (BMI 30-39.9)  -     Semaglutide-Weight Management (Wegovy) 0.25 MG/0.5ML solution auto-injector; Inject 0.25 mg under the skin into the appropriate area as directed 1 (One) Time Per Week.  Dispense: 2 mL; Refill: 0    f/u 3 months

## 2023-04-29 ENCOUNTER — HOSPITAL ENCOUNTER (EMERGENCY)
Facility: HOSPITAL | Age: 63
Discharge: HOME | End: 2023-04-29
Attending: FAMILY MEDICINE
Payer: COMMERCIAL

## 2023-04-29 ENCOUNTER — APPOINTMENT (OUTPATIENT)
Dept: CT IMAGING | Facility: HOSPITAL | Age: 63
End: 2023-04-29
Payer: COMMERCIAL

## 2023-04-29 VITALS
TEMPERATURE: 99.9 F | BODY MASS INDEX: 32.15 KG/M2 | OXYGEN SATURATION: 96 % | HEIGHT: 65 IN | WEIGHT: 193 LBS | RESPIRATION RATE: 16 BRPM | DIASTOLIC BLOOD PRESSURE: 86 MMHG | SYSTOLIC BLOOD PRESSURE: 127 MMHG | HEART RATE: 76 BPM

## 2023-04-29 DIAGNOSIS — K57.92 DIVERTICULITIS: ICD-10-CM

## 2023-04-29 DIAGNOSIS — R10.30 LOWER ABDOMINAL PAIN: Primary | ICD-10-CM

## 2023-04-29 LAB
ALBUMIN SERPL-MCNC: 4.3 G/DL (ref 3.5–5.2)
ALBUMIN/GLOB SERPL: 1.7 G/DL
ALP SERPL-CCNC: 85 U/L (ref 39–117)
ALT SERPL W P-5'-P-CCNC: 16 U/L (ref 1–33)
ANION GAP SERPL CALCULATED.3IONS-SCNC: 12 MMOL/L (ref 5–15)
AST SERPL-CCNC: 21 U/L (ref 1–32)
BASOPHILS # BLD AUTO: 0.04 10*3/MM3 (ref 0–0.2)
BASOPHILS NFR BLD AUTO: 0.5 % (ref 0–1.5)
BILIRUB SERPL-MCNC: 0.4 MG/DL (ref 0–1.2)
BILIRUB UR QL STRIP: NEGATIVE
BUN SERPL-MCNC: 6 MG/DL (ref 8–23)
BUN/CREAT SERPL: 8.6 (ref 7–25)
CALCIUM SPEC-SCNC: 8.8 MG/DL (ref 8.6–10.5)
CHLORIDE SERPL-SCNC: 105 MMOL/L (ref 98–107)
CK SERPL-CCNC: 79 U/L (ref 20–180)
CLARITY UR: CLEAR
CO2 SERPL-SCNC: 22 MMOL/L (ref 22–29)
COLOR UR: YELLOW
CREAT SERPL-MCNC: 0.7 MG/DL (ref 0.57–1)
D-LACTATE SERPL-SCNC: 1.3 MMOL/L (ref 0.5–2)
DEPRECATED RDW RBC AUTO: 43.5 FL (ref 37–54)
EGFRCR SERPLBLD CKD-EPI 2021: 97.9 ML/MIN/1.73
EOSINOPHIL # BLD AUTO: 0.11 10*3/MM3 (ref 0–0.4)
EOSINOPHIL NFR BLD AUTO: 1.3 % (ref 0.3–6.2)
ERYTHROCYTE [DISTWIDTH] IN BLOOD BY AUTOMATED COUNT: 12.6 % (ref 12.3–15.4)
GLOBULIN UR ELPH-MCNC: 2.6 GM/DL
GLUCOSE SERPL-MCNC: 121 MG/DL (ref 65–99)
GLUCOSE UR STRIP-MCNC: NEGATIVE MG/DL
HCT VFR BLD AUTO: 44.1 % (ref 34–46.6)
HGB BLD-MCNC: 14.3 G/DL (ref 12–15.9)
HGB UR QL STRIP.AUTO: NEGATIVE
IMM GRANULOCYTES # BLD AUTO: 0.02 10*3/MM3 (ref 0–0.05)
IMM GRANULOCYTES NFR BLD AUTO: 0.2 % (ref 0–0.5)
KETONES UR QL STRIP: NEGATIVE
LEUKOCYTE ESTERASE UR QL STRIP.AUTO: NEGATIVE
LIPASE SERPL-CCNC: 34 U/L (ref 13–60)
LYMPHOCYTES # BLD AUTO: 1.64 10*3/MM3 (ref 0.7–3.1)
LYMPHOCYTES NFR BLD AUTO: 19.7 % (ref 19.6–45.3)
MAGNESIUM SERPL-MCNC: 2.3 MG/DL (ref 1.6–2.4)
MCH RBC QN AUTO: 30.7 PG (ref 26.6–33)
MCHC RBC AUTO-ENTMCNC: 32.4 G/DL (ref 31.5–35.7)
MCV RBC AUTO: 94.6 FL (ref 79–97)
MONOCYTES # BLD AUTO: 0.55 10*3/MM3 (ref 0.1–0.9)
MONOCYTES NFR BLD AUTO: 6.6 % (ref 5–12)
NEUTROPHILS NFR BLD AUTO: 5.96 10*3/MM3 (ref 1.7–7)
NEUTROPHILS NFR BLD AUTO: 71.7 % (ref 42.7–76)
NITRITE UR QL STRIP: NEGATIVE
NRBC BLD AUTO-RTO: 0 /100 WBC (ref 0–0.2)
PH UR STRIP.AUTO: 6.5 [PH] (ref 5–8)
PLATELET # BLD AUTO: 181 10*3/MM3 (ref 140–450)
PMV BLD AUTO: 10.2 FL (ref 6–12)
POTASSIUM SERPL-SCNC: 4.6 MMOL/L (ref 3.5–5.2)
PROT SERPL-MCNC: 6.9 G/DL (ref 6–8.5)
PROT UR QL STRIP: NEGATIVE
RBC # BLD AUTO: 4.66 10*6/MM3 (ref 3.77–5.28)
SODIUM SERPL-SCNC: 139 MMOL/L (ref 136–145)
SP GR UR STRIP: 1.02 (ref 1–1.03)
UROBILINOGEN UR QL STRIP: NORMAL
WBC NRBC COR # BLD: 8.32 10*3/MM3 (ref 3.4–10.8)

## 2023-04-29 PROCEDURE — 85025 COMPLETE CBC W/AUTO DIFF WBC: CPT | Performed by: FAMILY MEDICINE

## 2023-04-29 PROCEDURE — 81003 URINALYSIS AUTO W/O SCOPE: CPT | Performed by: FAMILY MEDICINE

## 2023-04-29 PROCEDURE — 80053 COMPREHEN METABOLIC PANEL: CPT | Performed by: FAMILY MEDICINE

## 2023-04-29 PROCEDURE — 25010000002 ONDANSETRON PER 1 MG: Performed by: FAMILY MEDICINE

## 2023-04-29 PROCEDURE — 82550 ASSAY OF CK (CPK): CPT | Performed by: FAMILY MEDICINE

## 2023-04-29 PROCEDURE — 25510000001 IOPAMIDOL 61 % SOLUTION: Performed by: FAMILY MEDICINE

## 2023-04-29 PROCEDURE — 83735 ASSAY OF MAGNESIUM: CPT | Performed by: FAMILY MEDICINE

## 2023-04-29 PROCEDURE — 99283 EMERGENCY DEPT VISIT LOW MDM: CPT

## 2023-04-29 PROCEDURE — 74177 CT ABD & PELVIS W/CONTRAST: CPT

## 2023-04-29 PROCEDURE — 83690 ASSAY OF LIPASE: CPT | Performed by: FAMILY MEDICINE

## 2023-04-29 PROCEDURE — 96374 THER/PROPH/DIAG INJ IV PUSH: CPT

## 2023-04-29 PROCEDURE — 83605 ASSAY OF LACTIC ACID: CPT | Performed by: FAMILY MEDICINE

## 2023-04-29 RX ORDER — METRONIDAZOLE 500 MG/1
500 TABLET ORAL 3 TIMES DAILY
Qty: 21 TABLET | Refills: 0 | Status: SHIPPED | OUTPATIENT
Start: 2023-04-29 | End: 2023-05-06

## 2023-04-29 RX ORDER — SODIUM CHLORIDE 0.9 % (FLUSH) 0.9 %
10 SYRINGE (ML) INJECTION AS NEEDED
Status: DISCONTINUED | OUTPATIENT
Start: 2023-04-29 | End: 2023-04-29 | Stop reason: HOSPADM

## 2023-04-29 RX ORDER — ONDANSETRON 2 MG/ML
4 INJECTION INTRAMUSCULAR; INTRAVENOUS ONCE
Status: COMPLETED | OUTPATIENT
Start: 2023-04-29 | End: 2023-04-29

## 2023-04-29 RX ORDER — CIPROFLOXACIN 500 MG/1
500 TABLET, FILM COATED ORAL 2 TIMES DAILY
Qty: 14 TABLET | Refills: 0 | Status: SHIPPED | OUTPATIENT
Start: 2023-04-29 | End: 2023-05-06

## 2023-04-29 RX ORDER — ONDANSETRON 4 MG/1
4 TABLET, ORALLY DISINTEGRATING ORAL EVERY 8 HOURS PRN
Qty: 12 TABLET | Refills: 0 | Status: SHIPPED | OUTPATIENT
Start: 2023-04-29

## 2023-04-29 RX ADMIN — IOPAMIDOL 100 ML: 612 INJECTION, SOLUTION INTRAVENOUS at 14:49

## 2023-04-29 RX ADMIN — ONDANSETRON 4 MG: 2 INJECTION INTRAMUSCULAR; INTRAVENOUS at 14:05

## 2023-04-29 RX ADMIN — SODIUM CHLORIDE 1000 ML: 9 INJECTION, SOLUTION INTRAVENOUS at 14:05

## 2023-04-29 NOTE — ED PROVIDER NOTES
Subjective   History of Present Illness  62-year-old female has been complaining of some lower abdominal pain going on since Thursday.  Patient states that she feels like she is constipated.  Patient states that she took a laxative and did have a small bowel movement.  Patient states that she feels like her urine is warm.  Patient denies any dysuria hematuria.  Patient denies any increased urinary frequency or urgency.  Patient states that she was sent here for the urgent care for fever.  Patient had a temperature of 100.1 °F.  Patient has not had any fever reducing medications since urgent care she states.  Patient has no nausea vomiting.  Patient has no chest pain or shortness of breath.  Patient denies any other symptoms at this time.        Review of Systems   Gastrointestinal: Positive for abdominal pain.   All other systems reviewed and are negative.      Past Medical History:   Diagnosis Date   • Colon polyp 5 Years ago   • Depression    • Diverticulosis 5 years ago   • Family history of colonic polyps    • History of colon polyps    • Low back pain    • Obesity    • Seasonal allergies    • Sleep apnea     Never officially tested   • Tremor        Allergies   Allergen Reactions   • Morphine Itching and Swelling          • Topamax [Topiramate] Confusion     Brain fog, increased somnolence   • Flonase [Fluticasone] Other (See Comments)     Blisters around nose       Past Surgical History:   Procedure Laterality Date   •  SECTION      X 2   • COLONOSCOPY  5 years ago    I have one scheduled for Fri, 3/18   • COLONOSCOPY N/A 2022    Procedure: COLONOSCOPY WITH ANESTHESIA;  Surgeon: Keysha Disla MD;  Location: [facility] ENDOSCOPY;  Service: Gastroenterology;  Laterality: N/A;  pre screen  post diverticulosis,polyp  Dr. Garcia   • DENTAL PROCEDURE      implants x 2   • MOLE REMOVAL      From nose       Family History   Problem Relation Age of Onset   • Diabetes type II Father    • Heart disease Father     • Stroke Father             • COPD Father    • Diabetes Father    • Hearing loss Father         Hearing issues on my father's side of the family   • Breast cancer Mother    • Migraines Mother    • Cancer Mother    • Melanoma Sister    • Hearing loss Sister         tumor   • Multiple sclerosis Sister    • Thyroid disease Maternal Grandmother    • Dementia Maternal Grandmother    • Colon polyps Maternal Uncle         Unknown age   • Alcohol abuse Maternal Uncle    • Drug abuse Maternal Uncle    • Colon cancer Neg Hx    • Esophageal cancer Neg Hx    • Liver disease Neg Hx    • Ulcerative colitis Neg Hx    • Rectal cancer Neg Hx    • Stomach cancer Neg Hx    • Ovarian cancer Neg Hx    • Uterine cancer Neg Hx        Social History     Socioeconomic History   • Marital status:    Tobacco Use   • Smoking status: Former     Packs/day: 0.50     Years: 20.00     Pack years: 10.00     Types: Cigarettes     Start date: 1977     Quit date: 1999     Years since quittin.4   • Smokeless tobacco: Never   • Tobacco comments:        Vaping Use   • Vaping Use: Never used   Substance and Sexual Activity   • Alcohol use: Yes     Comment: Drink every evening, amounts and type vary.   • Drug use: Never   • Sexual activity: Not Currently     Partners: Male     Birth control/protection: Post-menopausal           Objective   Physical Exam  Vitals and nursing note reviewed.   Constitutional:       Appearance: She is well-developed.   HENT:      Head: Normocephalic and atraumatic.   Eyes:      General: No scleral icterus.  Cardiovascular:      Rate and Rhythm: Normal rate and regular rhythm.      Heart sounds: Normal heart sounds.   Pulmonary:      Effort: Pulmonary effort is normal.      Breath sounds: Normal breath sounds.   Abdominal:      General: Bowel sounds are normal.      Palpations: Abdomen is soft.      Tenderness: There is abdominal tenderness in the right lower quadrant, suprapubic area and  left lower quadrant. There is no guarding or rebound.   Skin:     General: Skin is warm and dry.   Neurological:      General: No focal deficit present.      Mental Status: She is alert and oriented to person, place, and time.   Psychiatric:         Mood and Affect: Mood normal.         Behavior: Behavior normal.         Procedures           ED Course                                         Lab Results (last 24 hours)     Procedure Component Value Units Date/Time    CBC & Differential [[id number]]  (Normal) Collected: 04/29/23 1404    Specimen: Blood Updated: 04/29/23 1413    Narrative:      The following orders were created for panel order CBC & Differential.  Procedure                               Abnormality         Status                     ---------                               -----------         ------                     CBC Auto Differential[[id number]]        Normal              Final result                 Please view results for these tests on the individual orders.    Comprehensive Metabolic Panel [[id number]]  (Abnormal) Collected: 04/29/23 1404    Specimen: Blood Updated: 04/29/23 1431     Glucose 121 mg/dL      BUN 6 mg/dL      Creatinine 0.70 mg/dL      Sodium 139 mmol/L      Potassium 4.6 mmol/L      Comment: Slight hemolysis detected by analyzer. Results may be affected.        Chloride 105 mmol/L      CO2 22.0 mmol/L      Calcium 8.8 mg/dL      Total Protein 6.9 g/dL      Albumin 4.3 g/dL      ALT (SGPT) 16 U/L      AST (SGOT) 21 U/L      Comment: Slight hemolysis detected by analyzer. Results may be affected.        Alkaline Phosphatase 85 U/L      Total Bilirubin 0.4 mg/dL      Globulin 2.6 gm/dL      A/G Ratio 1.7 g/dL      BUN/Creatinine Ratio 8.6     Anion Gap 12.0 mmol/L      eGFR 97.9 mL/min/1.73     Narrative:      GFR Normal >60  Chronic Kidney Disease <60  Kidney Failure <15      Lipase [[id number]]  (Normal) Collected: 04/29/23 1404    Specimen: Blood Updated: 04/29/23 1425     Lipase  34 U/L     Lactic Acid, Plasma [[id number]]  (Normal) Collected: 04/29/23 1404    Specimen: Blood Updated: 04/29/23 1428     Lactate 1.3 mmol/L     CK [[id number]]  (Normal) Collected: 04/29/23 1404    Specimen: Blood Updated: 04/29/23 1429     Creatine Kinase 79 U/L     Magnesium [[id number]]  (Normal) Collected: 04/29/23 1404    Specimen: Blood Updated: 04/29/23 1431     Magnesium 2.3 mg/dL     CBC Auto Differential [[id number]]  (Normal) Collected: 04/29/23 1404    Specimen: Blood Updated: 04/29/23 1413     WBC 8.32 10*3/mm3      RBC 4.66 10*6/mm3      Hemoglobin 14.3 g/dL      Hematocrit 44.1 %      MCV 94.6 fL      MCH 30.7 pg      MCHC 32.4 g/dL      RDW 12.6 %      RDW-SD 43.5 fl      MPV 10.2 fL      Platelets 181 10*3/mm3      Neutrophil % 71.7 %      Lymphocyte % 19.7 %      Monocyte % 6.6 %      Eosinophil % 1.3 %      Basophil % 0.5 %      Immature Grans % 0.2 %      Neutrophils, Absolute 5.96 10*3/mm3      Lymphocytes, Absolute 1.64 10*3/mm3      Monocytes, Absolute 0.55 10*3/mm3      Eosinophils, Absolute 0.11 10*3/mm3      Basophils, Absolute 0.04 10*3/mm3      Immature Grans, Absolute 0.02 10*3/mm3      nRBC 0.0 /100 WBC     Urinalysis With Culture If Indicated - Urine, Clean Catch [[id number]]  (Normal) Collected: 04/29/23 1458    Specimen: Urine, Clean Catch Updated: 04/29/23 1515     Color, UA Yellow     Appearance, UA Clear     pH, UA 6.5     Specific Gravity, UA 1.019     Glucose, UA Negative     Ketones, UA Negative     Bilirubin, UA Negative     Blood, UA Negative     Protein, UA Negative     Leuk Esterase, UA Negative     Nitrite, UA Negative     Urobilinogen, UA 0.2 E.U./dL    Narrative:      In absence of clinical symptoms, the presence of pyuria, bacteria, and/or nitrites on the urinalysis result does not correlate with infection.  Urine microscopic not indicated.          CT Abdomen Pelvis With Contrast   Final Result   1. There is diverticulitis of the sigmoid colon with inflammatory    changes surrounding a diverticulum at the junction of the descending and   sigmoid colon in the left lower quadrant. No evidence of extraluminal   air or diverticular abscess. Additional diverticulosis is noted in the   descending and sigmoid colon. There is mild constipation. The appendix   is normal in appearance.   2. Steatosis of the liver.   3. Small hiatal hernia.   4. No evidence of nephrolithiasis or obstructive uropathy..            This report was finalized on 04/29/2023 15:22 by Dr. Dereck Todd MD.          Medications   sodium chloride 0.9 % flush 10 mL (has no administration in time range)   sodium chloride 0.9 % bolus 1,000 mL (0 mL Intravenous Stopped 4/29/23 1435)   ondansetron (ZOFRAN) injection 4 mg (4 mg Intravenous Given 4/29/23 1405)   iopamidol (ISOVUE-300) 61 % injection 100 mL (100 mL Intravenous Given 4/29/23 1449)       Medical Decision Making  62-year-old female came in with abdominal pain.  Patient was found to have diverticulitis on the CAT scan.  Patient is in no distress.  Patient has not had any vomiting or diarrhea here in the emergency room.  Patient is afebrile.  Patient is nontoxic-appearing.  Patient will be given p.o. antibiotics for her diverticulitis.  Inpatient versus outpatient therapy was discussed with the patient.  Patient states that she would like to be discharged home and try outpatient therapy.  All questions were answered for patient prior to discharge.  Patient is advised to follow-up with her primary care provider.  Patient was advised to return to the emergency room with new or worsening symptoms.  Patient verbalized understanding was agreeable plan as discussed.    Diverticulitis: acute illness or injury  Lower abdominal pain: acute illness or injury  Amount and/or Complexity of Data Reviewed  Labs: ordered. Decision-making details documented in ED Course.  Radiology: ordered. Decision-making details documented in ED Course.      Risk  Prescription drug  management.          Final diagnoses:   Lower abdominal pain   Diverticulitis       ED Disposition  ED Disposition     ED Disposition   Discharge    Condition   Stable    Comment   --             Solomon Edward, DO  [address] [address]  [phone]    Schedule an appointment as soon as possible for a visit       [facility] Emergency Department  [address] [id number] [phone]    As needed, If symptoms worsen         Medication List      New Prescriptions    ciprofloxacin 500 MG tablet  Commonly known as: CIPRO  Take 1 tablet by mouth 2 (Two) Times a Day for 7 days.     metroNIDAZOLE 500 MG tablet  Commonly known as: FLAGYL  Take 1 tablet by mouth 3 (Three) Times a Day for 7 days.     ondansetron ODT 4 MG disintegrating tablet  Commonly known as: ZOFRAN-ODT  Place 1 tablet on the tongue Every 8 (Eight) Hours As Needed for Nausea or Vomiting.           Where to Get Your Medications      These medications were sent to [facility]/pharmacy #[id number] - [place], [place] - [address] AT ACROSS FROM [address]  [phone] [facility] [phone]   [address] [place] [id number]    Phone: [phone]   · ciprofloxacin 500 MG tablet  · metroNIDAZOLE 500 MG tablet  · ondansetron ODT 4 MG disintegrating tablet          Adam Fagan MD  04/29/23 9828

## 2023-05-03 ENCOUNTER — OFFICE VISIT (OUTPATIENT)
Dept: FAMILY MEDICINE CLINIC | Facility: CLINIC | Age: 63
End: 2023-05-03
Payer: COMMERCIAL

## 2023-05-03 VITALS
DIASTOLIC BLOOD PRESSURE: 68 MMHG | HEIGHT: 65 IN | OXYGEN SATURATION: 97 % | HEART RATE: 72 BPM | SYSTOLIC BLOOD PRESSURE: 118 MMHG | TEMPERATURE: 97.6 F | WEIGHT: 192.4 LBS | BODY MASS INDEX: 32.06 KG/M2

## 2023-05-03 DIAGNOSIS — K57.32 DIVERTICULITIS OF LARGE INTESTINE WITHOUT PERFORATION OR ABSCESS WITHOUT BLEEDING: Primary | ICD-10-CM

## 2023-05-03 NOTE — PROGRESS NOTES
"Chief Complaint  Hospital Follow Up Visit ([facility] ED 4/29/23.  Diverticulitis )    Subjective        VARUN Meyers presents to [facility]  History of Present Illness  Dx of diverticulitis without perforation on ED visit above  Finished abx on Saturday  Feels well today  ROS otherwise neg  Did have gastroenteritis with about 1 week of diarrhea prior to diverticulitis    Objective   Vital Signs:  /68 (BP Location: Left arm, Patient Position: Sitting, Cuff Size: Adult)   Pulse 72   Temp 97.6 °F (36.4 °C) (Temporal)   Ht 165.1 cm (65\")   Wt 87.3 kg (192 lb 6.4 oz)   SpO2 97%   BMI 32.02 kg/m²   Estimated body mass index is 32.02 kg/m² as calculated from the following:    Height as of this encounter: 165.1 cm (65\").    Weight as of this encounter: 87.3 kg (192 lb 6.4 oz).             Physical Exam  Vitals and nursing note reviewed.   Constitutional:       General: She is not in acute distress.     Appearance: She is not diaphoretic.   HENT:      Head: Normocephalic and atraumatic.      Nose: Nose normal.   Eyes:      General: No scleral icterus.        Right eye: No discharge.         Left eye: No discharge.      Conjunctiva/sclera: Conjunctivae normal.   Neck:      Trachea: No tracheal deviation.   Pulmonary:      Effort: Pulmonary effort is normal.   Skin:     General: Skin is warm and dry.      Coloration: Skin is not pale.   Neurological:      Mental Status: She is alert and oriented to person, place, and time.   Psychiatric:         Behavior: Behavior normal.         Thought Content: Thought content normal.         Judgment: Judgment normal.        Result Review :                   Assessment and Plan   Diagnoses and all orders for this visit:    1. Diverticulitis of large intestine without perforation or abscess without bleeding (Primary)    resolved  Gave handout and recommend increase fiber intake         Follow Up   Return if symptoms worsen or fail to " improve.  Patient was given instructions and counseling regarding her condition or for health maintenance advice. Please see specific information pulled into the AVS if appropriate.

## 2023-05-12 DIAGNOSIS — G25.0 ESSENTIAL TREMOR: ICD-10-CM

## 2023-05-12 RX ORDER — PRIMIDONE 50 MG/1
TABLET ORAL
Qty: 180 TABLET | Refills: 1 | Status: SHIPPED | OUTPATIENT
Start: 2023-05-12

## 2023-08-23 ENCOUNTER — TELEPHONE (OUTPATIENT)
Dept: FAMILY MEDICINE CLINIC | Facility: CLINIC | Age: 63
End: 2023-08-23

## 2023-08-23 NOTE — TELEPHONE ENCOUNTER
"Caller: VARUN Meyers \"VERONIQUE\"    Relationship to patient: Self    Best call back number: [phone]    Patient is needing: TO KNOW IF SHE COULD CANCEL 10/24TH APPOINTMENT, STATES IT MAY HAVE BEEN TO FOLLOW UP ON WEIGHT LOSS MEDICATION, WEGOVY, BUT SHE DOES NOT FEEL COMFORTABLE TAKING IT FOR WEIGHT LOSS.  "

## 2023-10-02 DIAGNOSIS — F32.A DEPRESSIVE DISORDER: ICD-10-CM

## 2023-11-08 DIAGNOSIS — G25.0 ESSENTIAL TREMOR: ICD-10-CM

## 2023-11-08 RX ORDER — PRIMIDONE 50 MG/1
TABLET ORAL
Qty: 180 TABLET | Refills: 1 | Status: SHIPPED | OUTPATIENT
Start: 2023-11-08

## 2023-12-19 ENCOUNTER — LAB (OUTPATIENT)
Dept: LAB | Facility: HOSPITAL | Age: 63
End: 2023-12-19
Payer: COMMERCIAL

## 2023-12-19 ENCOUNTER — OFFICE VISIT (OUTPATIENT)
Dept: OBSTETRICS AND GYNECOLOGY | Facility: CLINIC | Age: 63
End: 2023-12-19
Payer: COMMERCIAL

## 2023-12-19 VITALS
BODY MASS INDEX: 31.99 KG/M2 | DIASTOLIC BLOOD PRESSURE: 80 MMHG | SYSTOLIC BLOOD PRESSURE: 118 MMHG | HEIGHT: 65 IN | WEIGHT: 192 LBS

## 2023-12-19 DIAGNOSIS — Z01.419 ENCOUNTER FOR GYNECOLOGICAL EXAMINATION WITHOUT ABNORMAL FINDING: Primary | ICD-10-CM

## 2023-12-19 DIAGNOSIS — Z13.820 OSTEOPOROSIS SCREENING: ICD-10-CM

## 2023-12-19 DIAGNOSIS — Z12.31 SCREENING MAMMOGRAM, ENCOUNTER FOR: ICD-10-CM

## 2023-12-19 DIAGNOSIS — F32.A DEPRESSIVE DISORDER: ICD-10-CM

## 2023-12-19 DIAGNOSIS — Z00.00 ANNUAL PHYSICAL EXAM: ICD-10-CM

## 2023-12-19 LAB
ALBUMIN SERPL-MCNC: 4.5 G/DL (ref 3.5–5.2)
ALBUMIN/GLOB SERPL: 2 G/DL
ALP SERPL-CCNC: 70 U/L (ref 39–117)
ALT SERPL W P-5'-P-CCNC: 19 U/L (ref 1–33)
ANION GAP SERPL CALCULATED.3IONS-SCNC: 11 MMOL/L (ref 5–15)
AST SERPL-CCNC: 21 U/L (ref 1–32)
BILIRUB SERPL-MCNC: 0.5 MG/DL (ref 0–1.2)
BUN SERPL-MCNC: 10 MG/DL (ref 8–23)
BUN/CREAT SERPL: 14.3 (ref 7–25)
CALCIUM SPEC-SCNC: 8.9 MG/DL (ref 8.6–10.5)
CHLORIDE SERPL-SCNC: 105 MMOL/L (ref 98–107)
CHOLEST SERPL-MCNC: 198 MG/DL (ref 0–200)
CO2 SERPL-SCNC: 27 MMOL/L (ref 22–29)
CREAT SERPL-MCNC: 0.7 MG/DL (ref 0.57–1)
DEPRECATED RDW RBC AUTO: 44.4 FL (ref 37–54)
EGFRCR SERPLBLD CKD-EPI 2021: 97.3 ML/MIN/1.73
ERYTHROCYTE [DISTWIDTH] IN BLOOD BY AUTOMATED COUNT: 12.8 % (ref 12.3–15.4)
GLOBULIN UR ELPH-MCNC: 2.2 GM/DL
GLUCOSE SERPL-MCNC: 95 MG/DL (ref 65–99)
HCT VFR BLD AUTO: 44 % (ref 34–46.6)
HDLC SERPL-MCNC: 71 MG/DL (ref 40–60)
HGB BLD-MCNC: 14.4 G/DL (ref 12–15.9)
LDLC SERPL CALC-MCNC: 100 MG/DL (ref 0–100)
LDLC/HDLC SERPL: 1.35 {RATIO}
MCH RBC QN AUTO: 30.8 PG (ref 26.6–33)
MCHC RBC AUTO-ENTMCNC: 32.7 G/DL (ref 31.5–35.7)
MCV RBC AUTO: 94.2 FL (ref 79–97)
PLATELET # BLD AUTO: 154 10*3/MM3 (ref 140–450)
PMV BLD AUTO: 9.9 FL (ref 6–12)
POTASSIUM SERPL-SCNC: 4.2 MMOL/L (ref 3.5–5.2)
PROT SERPL-MCNC: 6.7 G/DL (ref 6–8.5)
RBC # BLD AUTO: 4.67 10*6/MM3 (ref 3.77–5.28)
SODIUM SERPL-SCNC: 143 MMOL/L (ref 136–145)
TRIGL SERPL-MCNC: 157 MG/DL (ref 0–150)
VLDLC SERPL-MCNC: 27 MG/DL (ref 5–40)
WBC NRBC COR # BLD AUTO: 5.48 10*3/MM3 (ref 3.4–10.8)

## 2023-12-19 PROCEDURE — 80053 COMPREHEN METABOLIC PANEL: CPT

## 2023-12-19 PROCEDURE — 85027 COMPLETE CBC AUTOMATED: CPT

## 2023-12-19 PROCEDURE — 80061 LIPID PANEL: CPT

## 2023-12-19 PROCEDURE — 36415 COLL VENOUS BLD VENIPUNCTURE: CPT

## 2023-12-19 RX ORDER — SERTRALINE HYDROCHLORIDE 25 MG/1
25 TABLET, FILM COATED ORAL DAILY
Qty: 90 TABLET | Refills: 0 | Status: CANCELLED | OUTPATIENT
Start: 2023-12-19

## 2023-12-19 NOTE — PROGRESS NOTES
"Chief Complaint  Annual Exam (Pt is here for an annual exam/Last pap 12/20/22 WNL/Last mammogram 12/20/22 benign /Pt has no complaints today )    Subjective          VARUN Meyers presents to [facility]  History of Present Illness  The patient presents for an annual exam.    She denies any GYN complaints.     Dr. Edward is her primary care physician.   She is on Zoloft prescribed by Dr. Edward.   She has mood swings, but they level out with the Zoloft.      Review of Systems   Constitutional:  Negative for activity change, appetite change, fatigue and fever.   HENT:  Negative for congestion, sore throat and trouble swallowing.    Eyes:  Negative for pain, discharge and visual disturbance.   Respiratory:  Negative for apnea, shortness of breath and wheezing.    Cardiovascular:  Negative for chest pain, palpitations and leg swelling.   Gastrointestinal:  Negative for abdominal pain, constipation and diarrhea.   Genitourinary:  Negative for frequency, pelvic pain, urgency, vaginal bleeding and vaginal discharge.   Musculoskeletal:  Negative for back pain and gait problem.   Skin:  Negative for color change and rash.   Neurological:  Negative for dizziness, weakness and numbness.   Psychiatric/Behavioral:  Negative for confusion and sleep disturbance.         Doing well with Zoloft          Objective   Vital Signs:   /80   Ht 165.1 cm (65\")   Wt 87.1 kg (192 lb)   BMI 31.95 kg/m²     Physical Exam  Vitals and nursing note reviewed. Exam conducted with a chaperone present.   Constitutional:       General: She is not in acute distress.     Appearance: She is well-developed. She is not diaphoretic.   HENT:      Head: Normocephalic.      Right Ear: External ear normal.      Left Ear: External ear normal.      Nose: Nose normal.   Eyes:      General: No scleral icterus.        Right eye: No discharge.         Left eye: No discharge.      Conjunctiva/sclera: Conjunctivae normal.      " Pupils: Pupils are equal, round, and reactive to light.   Neck:      Thyroid: No thyromegaly.      Vascular: No carotid bruit.      Trachea: No tracheal deviation.   Cardiovascular:      Rate and Rhythm: Normal rate and regular rhythm.      Heart sounds: Normal heart sounds. No murmur heard.  Pulmonary:      Effort: Pulmonary effort is normal. No respiratory distress.      Breath sounds: Normal breath sounds. No wheezing.   Chest:   Breasts:     Breasts are symmetrical.      Right: Normal. No swelling, bleeding, inverted nipple, mass, nipple discharge, skin change or tenderness.      Left: Normal. No swelling, bleeding, inverted nipple, mass, nipple discharge, skin change or tenderness.   Abdominal:      General: There is no distension.      Palpations: Abdomen is soft. There is no mass.      Tenderness: There is no abdominal tenderness. There is no right CVA tenderness, left CVA tenderness or guarding.      Hernia: No hernia is present. There is no hernia in the left inguinal area or right inguinal area.   Genitourinary:     General: Normal vulva.      Exam position: Lithotomy position.      Labia:         Right: No rash, tenderness, lesion or injury.         Left: No rash, tenderness, lesion or injury.       Vagina: Normal. No signs of injury and foreign body. No vaginal discharge, erythema, tenderness or bleeding.      Cervix: Normal.      Uterus: Normal. Not enlarged, not fixed and not tender.       Adnexa: Right adnexa normal and left adnexa normal.        Right: No mass, tenderness or fullness.          Left: No mass, tenderness or fullness.        Rectum: Normal. No mass.      Comments:   BSU normal  Urethral meatus  Normal  Perineum  Normal  Musculoskeletal:         General: No tenderness. Normal range of motion.      Cervical back: Normal range of motion and neck supple.   Lymphadenopathy:      Head:      Right side of head: No submental, submandibular, tonsillar, preauricular, posterior auricular or  occipital adenopathy.      Left side of head: No submental, submandibular, tonsillar, preauricular, posterior auricular or occipital adenopathy.      Cervical: No cervical adenopathy.      Right cervical: No superficial, deep or posterior cervical adenopathy.     Left cervical: No superficial, deep or posterior cervical adenopathy.      Upper Body:      Right upper body: No supraclavicular, axillary or pectoral adenopathy.      Left upper body: No supraclavicular, axillary or pectoral adenopathy.      Lower Body: No right inguinal adenopathy. No left inguinal adenopathy.   Skin:     General: Skin is warm and dry.      Findings: No bruising, erythema or rash.   Neurological:      Mental Status: She is alert and oriented to person, place, and time.      Coordination: Coordination normal.   Psychiatric:         Mood and Affect: Mood normal.         Behavior: Behavior normal.         Thought Content: Thought content normal.         Judgment: Judgment normal.         Result Review :                     Assessment and Plan      Well woman GYN exam.   Pap smear not indicate  per ASCCP guidelines.     Colonoscopy is up to date    Bone density ordered.     Discussed STD prevention and testing.   Pt declines Chlamydia/Gonorrhea/Trichomonas, RPR, Hep panel and HIV testing.     Mammogram will be scheduled at [facility].     Educational material provided related to breast self awareness, exercising to stay healthy, calcium/vitamin D, and Kegel exercises.       Diagnoses and all orders for this visit:    1. Encounter for gynecological examination without abnormal finding (Primary)    2. Screening mammogram, encounter for  -     Mammo Screening Digital Tomosynthesis Bilateral With CAD; Future    3. Depressive disorder    4. Osteoporosis screening  -     DEXA Bone Density Axial; Future               Follow Up   Return for Annual physical.    Patient was given instructions and counseling regarding her condition or for health maintenance  advice. Please see specific information pulled into the AVS if appropriate.     Transcribed from ambient dictation for MARIA ALEJANDRA Mandujano by Gabi Curtis.  12/19/23   12:26 CST    Patient or patient representative verbalized consent to the visit recording.  I have personally performed the services described in this document as transcribed by the above individual, and it is both accurate and complete.  MARIA ALEJANDRA Mandujano  12/30/2023  22:20 CST

## 2023-12-19 NOTE — PATIENT INSTRUCTIONS

## 2024-01-03 LAB
NCCN CRITERIA FLAG: NORMAL
TYRER CUZICK SCORE: 17.8

## 2024-01-04 ENCOUNTER — OFFICE VISIT (OUTPATIENT)
Dept: FAMILY MEDICINE CLINIC | Facility: CLINIC | Age: 64
End: 2024-01-04
Payer: COMMERCIAL

## 2024-01-04 VITALS
TEMPERATURE: 97.2 F | BODY MASS INDEX: 31.99 KG/M2 | HEART RATE: 100 BPM | OXYGEN SATURATION: 96 % | SYSTOLIC BLOOD PRESSURE: 139 MMHG | RESPIRATION RATE: 19 BRPM | WEIGHT: 192 LBS | HEIGHT: 65 IN | DIASTOLIC BLOOD PRESSURE: 80 MMHG

## 2024-01-04 DIAGNOSIS — M99.03 SOMATIC DYSFUNCTION OF SPINE, LUMBAR: ICD-10-CM

## 2024-01-04 DIAGNOSIS — M99.04 SOMATIC DYSFUNCTION OF SPINE, SACRAL: ICD-10-CM

## 2024-01-04 DIAGNOSIS — M25.521 RIGHT ELBOW PAIN: ICD-10-CM

## 2024-01-04 DIAGNOSIS — M99.06 SOMATIC DYSFUNCTION OF LOWER EXTREMITY: ICD-10-CM

## 2024-01-04 DIAGNOSIS — M99.07 SOMATIC DYSFUNCTION OF UPPER EXTREMITY: ICD-10-CM

## 2024-01-04 DIAGNOSIS — M99.05 SOMATIC DYSFUNCTION OF PELVIC REGION: ICD-10-CM

## 2024-01-04 DIAGNOSIS — M54.50 CHRONIC BILATERAL LOW BACK PAIN WITHOUT SCIATICA: Primary | ICD-10-CM

## 2024-01-04 DIAGNOSIS — G89.29 CHRONIC BILATERAL LOW BACK PAIN WITHOUT SCIATICA: Primary | ICD-10-CM

## 2024-01-10 ENCOUNTER — TELEPHONE (OUTPATIENT)
Dept: OBSTETRICS AND GYNECOLOGY | Facility: CLINIC | Age: 64
End: 2024-01-10
Payer: COMMERCIAL

## 2024-01-10 ENCOUNTER — HOSPITAL ENCOUNTER (OUTPATIENT)
Dept: MAMMOGRAPHY | Facility: HOSPITAL | Age: 64
Discharge: HOME | End: 2024-01-10
Payer: COMMERCIAL

## 2024-01-10 ENCOUNTER — HOSPITAL ENCOUNTER (OUTPATIENT)
Dept: BONE DENSITY | Facility: HOSPITAL | Age: 64
Discharge: HOME | End: 2024-01-10
Payer: COMMERCIAL

## 2024-01-10 DIAGNOSIS — Z12.31 SCREENING MAMMOGRAM, ENCOUNTER FOR: ICD-10-CM

## 2024-01-10 DIAGNOSIS — Z13.820 OSTEOPOROSIS SCREENING: ICD-10-CM

## 2024-01-10 DIAGNOSIS — Z79.899 MEDICATION MANAGEMENT: Primary | ICD-10-CM

## 2024-01-10 PROCEDURE — 77067 SCR MAMMO BI INCL CAD: CPT

## 2024-01-10 PROCEDURE — 77063 BREAST TOMOSYNTHESIS BI: CPT

## 2024-01-10 PROCEDURE — 77080 DXA BONE DENSITY AXIAL: CPT

## 2024-01-10 NOTE — TELEPHONE ENCOUNTER
----- Message from MARIA ALEJANDRA Mandujano sent at 1/10/2024  9:56 AM CST -----  Osteopenia  Pt will need to start calcium and vitamin D supplements.  Total (diet and supplement) intake should be as follows:  Calcium 1,200 mg/day  Vit D (D3) 2,000 IU/day    If pt does not have restrictions needs to have regular weight bearing/muscle strengthening exercise (walking 30-40 min/session)    It would be best if pt can return for Vitamin D levels before starting supplements.     Please submit the order for vit D with dx medication management.     [employer] message sent.

## 2024-01-12 DIAGNOSIS — F32.A DEPRESSIVE DISORDER: ICD-10-CM

## 2024-01-12 RX ORDER — SERTRALINE HYDROCHLORIDE 25 MG/1
25 TABLET, FILM COATED ORAL DAILY
Qty: 90 TABLET | Refills: 0 | Status: SHIPPED | OUTPATIENT
Start: 2024-01-12

## 2024-01-17 NOTE — PROGRESS NOTES
"Chief Complaint  Back Pain (And right arm issues)    Subjective        VARUN Meyers presents to [facility]  Back Pain      Moderate aching right forearm and elbow pain after lifting, also been having a mild worsening of her chronic aching low back pain    Objective   Vital Signs:  /80 (BP Location: Left arm, Patient Position: Sitting, Cuff Size: Adult)   Pulse 100   Temp 97.2 °F (36.2 °C) (Infrared)   Resp 19   Ht 165.1 cm (65\")   Wt 87.1 kg (192 lb)   SpO2 96%   BMI 31.95 kg/m²   Estimated body mass index is 31.95 kg/m² as calculated from the following:    Height as of this encounter: 165.1 cm (65\").    Weight as of this encounter: 87.1 kg (192 lb).               Physical Exam  Vitals and nursing note reviewed.   Constitutional:       General: She is not in acute distress.     Appearance: She is not diaphoretic.   HENT:      Head: Normocephalic and atraumatic.      Nose: Nose normal.   Eyes:      General: No scleral icterus.        Right eye: No discharge.         Left eye: No discharge.      Conjunctiva/sclera: Conjunctivae normal.   Neck:      Trachea: No tracheal deviation.   Pulmonary:      Effort: Pulmonary effort is normal.   Skin:     General: Skin is warm and dry.      Coloration: Skin is not pale.   Neurological:      Mental Status: She is alert and oriented to person, place, and time.   Psychiatric:         Behavior: Behavior normal.         Thought Content: Thought content normal.         Judgment: Judgment normal.      Osteopathic Structural Exam  Procedure Note for Osteopathic Manipulative Treatment    Pre-procedure diagnoses: Somatic dysfunctions as listed below.  Consent: Oral consent given for Osteopathic Treatment  Post-procedure diagnoses: same  Complications of procedure: none, patient tolerated procedure well    The evaluation including the history, physical exam and the management decisions, indicate than an appropriate intervention on this " date of service is osteopathic manipulative treatment. Oral permission for the osteopathic procedure was obtained. The following treatment methods and the responses for each body region are listed below.        Region Somatic Dysfunction Severity OMT technique Response      Lumbar L4 ERSL Moderate Balanced ligamentous tension Improved      Sacral L on L Moderate Balanced ligamentous tension Improved   Pelvic R anterior rotation  L posterior rotation Moderate Balanced ligamentous tension Improved      Lower Extremities  R psoas spasm  Moderate  Balanced ligamentous tension Improved       Upper Extremities  R posterior radial head  Moderate  Balanced ligamentous tension  Improved        Result Review :                     Assessment and Plan     Diagnoses and all orders for this visit:    1. Chronic bilateral low back pain without sciatica (Primary)    2. Right elbow pain    3. Somatic dysfunction of spine, lumbar    4. Somatic dysfunction of spine, sacral    5. Somatic dysfunction of pelvic region    6. Somatic dysfunction of lower extremity    7. Somatic dysfunction of upper extremity    OMT to balance autonomic tone, improve fascial symmetry and respiratory/circulatory/lymphatic compliance  OTC pain meds PRN  Encourage stretching  F/u PRN

## 2024-01-18 ENCOUNTER — TELEPHONE (OUTPATIENT)
Dept: NEUROLOGY | Facility: CLINIC | Age: 64
End: 2024-01-18
Payer: COMMERCIAL

## 2024-01-22 ENCOUNTER — OFFICE VISIT (OUTPATIENT)
Dept: NEUROLOGY | Facility: CLINIC | Age: 64
End: 2024-01-22
Payer: COMMERCIAL

## 2024-01-22 VITALS
WEIGHT: 192 LBS | RESPIRATION RATE: 18 BRPM | SYSTOLIC BLOOD PRESSURE: 170 MMHG | DIASTOLIC BLOOD PRESSURE: 100 MMHG | HEART RATE: 74 BPM | BODY MASS INDEX: 31.99 KG/M2 | HEIGHT: 65 IN

## 2024-01-22 DIAGNOSIS — G25.0 ESSENTIAL TREMOR: ICD-10-CM

## 2024-01-22 PROCEDURE — 99214 OFFICE O/P EST MOD 30 MIN: CPT | Performed by: PSYCHIATRY & NEUROLOGY

## 2024-01-22 RX ORDER — PRIMIDONE 50 MG/1
150 TABLET ORAL NIGHTLY
Qty: 270 TABLET | Refills: 3 | Status: SHIPPED | OUTPATIENT
Start: 2024-01-22 | End: 2025-01-21

## 2024-01-22 NOTE — PROGRESS NOTES
"Chief Complaint  Tremors    Subjective        VARUN Meyers presents to [facility]    History of Present Illness    The patient is a 63-year-old female here for follow-up of tremor.    She denies any substantial changes or differences since her last visit. Her tremor has improved. It breaks through occasionally when she is tired, has not eaten, when her blood glucose lowers, or when she is dehydrated. She is tolerating the medication well.    Past Medical History:   Diagnosis Date    Colon polyp 5 Years ago    Depression     Diverticulosis 5 years ago    Family history of colonic polyps     History of colon polyps     Low back pain     Obesity     Seasonal allergies     Sleep apnea     Never officially tested    Tremor           Current Outpatient Medications:     cetirizine (zyrTEC) 10 MG tablet, Take 1 tablet by mouth Daily., Disp: , Rfl:     ibuprofen (ADVIL,MOTRIN) 200 MG tablet, Take 1 tablet by mouth Every 6 (Six) Hours As Needed., Disp: , Rfl:     primidone (MYSOLINE) 50 MG tablet, TAKE 2 TABLETS BY MOUTH EVERY NIGHT (Patient taking differently: Take 1 tablet by mouth Every Night.), Disp: 180 tablet, Rfl: 1    sertraline (ZOLOFT) 25 MG tablet, Take 1 tablet by mouth Daily. Along with the 50 mg sertraline for mood.  Take with food., Disp: 90 tablet, Rfl: 0    sertraline (ZOLOFT) 50 MG tablet, Take 1 tablet by mouth Daily. Along with the 25 mg sertraline pill for mood.  Take with food., Disp: 90 tablet, Rfl: 0       Objective   Vital Signs:   /100 (BP Location: Left arm, Patient Position: Sitting)   Pulse 74   Resp 18   Ht 165.1 cm (65\")   Wt 87.1 kg (192 lb)   BMI 31.95 kg/m²     Physical Exam  Constitutional:       General: She is awake.   Eyes:      Extraocular Movements: Extraocular movements intact.      Pupils: Pupils are equal, round, and reactive to light.   Neurological:      Mental Status: She is alert.      Deep Tendon Reflexes: Reflexes are normal and " symmetric.   Psychiatric:         Speech: Speech normal.      Minimal low amplitude postural tremor with arms outstretched    Result Review :                     Assessment and Plan   The patient is a 63-year-old female with essential tremor.  She has had a good response from primidone. She is having what she calls some breakthrough symptoms for which she would like to try a higher dose.     1. She can increase primidone up to 150 mg as tolerated.  2. The patient will follow up in 1 year, call sooner with any issues.    No follow-ups on file.  Patient was given instructions and counseling regarding her condition or for health maintenance advice. Please see specific information pulled into the AVS if appropriate.     Transcribed from ambient dictation for Yoanna Masters MD by Nilsa Celeste.  01/22/24   16:23 CST    Patient or patient representative verbalized consent to the visit recording.  I have personally performed the services described in this document as transcribed by the above individual, and it is both accurate and complete.

## 2024-01-23 DIAGNOSIS — E55.9 VITAMIN D DEFICIENCY: Primary | ICD-10-CM

## 2024-01-23 LAB — 25(OH)D3+25(OH)D2 SERPL-MCNC: 18.8 NG/ML (ref 30–100)

## 2024-01-23 RX ORDER — ERGOCALCIFEROL 1.25 MG/1
50000 CAPSULE ORAL WEEKLY
Qty: 12 CAPSULE | Refills: 3 | Status: SHIPPED | OUTPATIENT
Start: 2024-01-23

## 2024-04-09 DIAGNOSIS — F32.A DEPRESSIVE DISORDER: ICD-10-CM

## 2024-04-09 RX ORDER — SERTRALINE HYDROCHLORIDE 25 MG/1
25 TABLET, FILM COATED ORAL DAILY
Qty: 90 TABLET | Refills: 0 | Status: SHIPPED | OUTPATIENT
Start: 2024-04-09

## 2024-04-29 ENCOUNTER — HOSPITAL ENCOUNTER (OUTPATIENT)
Dept: GENERAL RADIOLOGY | Facility: HOSPITAL | Age: 64
Discharge: HOME | End: 2024-04-29
Admitting: FAMILY MEDICINE
Payer: COMMERCIAL

## 2024-04-29 ENCOUNTER — OFFICE VISIT (OUTPATIENT)
Dept: FAMILY MEDICINE CLINIC | Facility: CLINIC | Age: 64
End: 2024-04-29
Payer: COMMERCIAL

## 2024-04-29 VITALS
HEART RATE: 72 BPM | HEIGHT: 65 IN | WEIGHT: 198.6 LBS | OXYGEN SATURATION: 97 % | RESPIRATION RATE: 18 BRPM | TEMPERATURE: 97.5 F | BODY MASS INDEX: 33.09 KG/M2 | DIASTOLIC BLOOD PRESSURE: 90 MMHG | SYSTOLIC BLOOD PRESSURE: 132 MMHG

## 2024-04-29 DIAGNOSIS — M79.671 PAIN OF RIGHT HEEL: ICD-10-CM

## 2024-04-29 DIAGNOSIS — M79.671 PAIN OF RIGHT HEEL: Primary | ICD-10-CM

## 2024-04-29 PROCEDURE — 99213 OFFICE O/P EST LOW 20 MIN: CPT | Performed by: FAMILY MEDICINE

## 2024-04-29 PROCEDURE — 73630 X-RAY EXAM OF FOOT: CPT

## 2024-04-29 RX ORDER — METHYLPREDNISOLONE 4 MG/1
TABLET ORAL
Qty: 21 TABLET | Refills: 0 | Status: SHIPPED | OUTPATIENT
Start: 2024-04-29 | End: 2024-05-08

## 2024-05-07 ENCOUNTER — TELEPHONE (OUTPATIENT)
Dept: PODIATRY | Facility: CLINIC | Age: 64
End: 2024-05-07
Payer: COMMERCIAL

## 2024-05-08 ENCOUNTER — OFFICE VISIT (OUTPATIENT)
Dept: PODIATRY | Facility: CLINIC | Age: 64
End: 2024-05-08
Payer: COMMERCIAL

## 2024-05-08 VITALS
HEART RATE: 61 BPM | WEIGHT: 196 LBS | BODY MASS INDEX: 32.65 KG/M2 | HEIGHT: 65 IN | SYSTOLIC BLOOD PRESSURE: 142 MMHG | DIASTOLIC BLOOD PRESSURE: 76 MMHG | OXYGEN SATURATION: 97 %

## 2024-05-08 DIAGNOSIS — M77.31 CALCANEAL SPUR OF RIGHT FOOT: ICD-10-CM

## 2024-05-08 DIAGNOSIS — M79.671 PAIN OF RIGHT HEEL: Primary | ICD-10-CM

## 2024-05-09 ENCOUNTER — PATIENT ROUNDING (BHMG ONLY) (OUTPATIENT)
Dept: PODIATRY | Facility: CLINIC | Age: 64
End: 2024-05-09
Payer: COMMERCIAL

## 2024-05-14 NOTE — PROGRESS NOTES
"Chief Complaint  Foot Pain (Pt states right heel pain. Started about a month ago, and pain level 6/10)    Subjective        VARUN Meyers presents to [facility]  Foot Pain      6/10 aching pain along the R heel for about 4 weeks, difficulty ambulating as a result. Does not feel like plantar fasciitis.     Objective   Vital Signs:  /90   Pulse 72   Temp 97.5 °F (36.4 °C)   Resp 18   Ht 165.1 cm (65\")   Wt 90.1 kg (198 lb 9.6 oz)   SpO2 97%   BMI 33.05 kg/m²   Estimated body mass index is 33.05 kg/m² as calculated from the following:    Height as of this encounter: 165.1 cm (65\").    Weight as of this encounter: 90.1 kg (198 lb 9.6 oz).             Physical Exam  Vitals and nursing note reviewed.   Constitutional:       General: She is not in acute distress.     Appearance: She is not diaphoretic.   HENT:      Head: Normocephalic and atraumatic.      Nose: Nose normal.   Eyes:      General: No scleral icterus.        Right eye: No discharge.         Left eye: No discharge.      Conjunctiva/sclera: Conjunctivae normal.   Neck:      Trachea: No tracheal deviation.   Pulmonary:      Effort: Pulmonary effort is normal.   Musculoskeletal:        Legs:    Skin:     General: Skin is warm and dry.      Coloration: Skin is not pale.   Neurological:      Mental Status: She is alert and oriented to person, place, and time.   Psychiatric:         Behavior: Behavior normal.         Thought Content: Thought content normal.         Judgment: Judgment normal.        Result Review :                     Assessment and Plan     Diagnoses and all orders for this visit:    1. Pain of right heel (Primary)  -     XR Foot 3+ View Right; Future  -     methylPREDNISolone (MEDROL) 4 MG dose pack; Take as directed on package instructions.  Dispense: 21 tablet; Refill: 0  -     Ambulatory Referral to Podiatry             Follow Up     Return if symptoms worsen or fail to improve.  Patient was " given instructions and counseling regarding her condition or for health maintenance advice. Please see specific information pulled into the AVS if appropriate.

## 2024-07-03 NOTE — PROGRESS NOTES
[facility] - PODIATRY    Today's Date: 2024     Patient Name: VARUN Meyers  MRN: [id number]  [facility]: [id number]  PCP: Solomon Edward DO  Referring Provider: No ref. provider found    SUBJECTIVE     Chief Complaint   Patient presents with    Follow-up     Solomon Edward DO-2024-1 MO FU WITH DR SALDAÑA  Patient states that its not better but not worse either.  Patient pain 8/10 at worst  Last PCP visit 24     HPI: VARUN Meyers, a 64 y.o.female, comes to clinic as a(n) established patient complaining of foot pain. Patient has h/o Depression, diverticulosis, plantar fasciitis, sleep apnea, tremors .  Since last visit, she has been wearing her cam boot daily.  Notes mild improvement while wearing the cam boot but continues to have heel pain when not wearing it.  She admits to stretching once daily in the mornings.  Denies any significant improvement with previous steroid pack. Patient is non-diabetic. Admits pain at 8/10 level, described as shooting and aching, and centered around Left heel . Relates previous treatment(s) including steroids, stretching . Denies any constitutional symptoms. No other pedal complaints at this time.    Past Medical History:   Diagnosis Date    Colon polyp 5 Years ago    Depression     Diverticulosis 5 years ago    Family history of colonic polyps     History of colon polyps     Low back pain     Obesity     Seasonal allergies     Sleep apnea     Never officially tested    Tremor      Past Surgical History:   Procedure Laterality Date     SECTION      X 2    COLONOSCOPY  5 years ago    I have one scheduled for Fri, 3/18    COLONOSCOPY N/A 2022    Procedure: COLONOSCOPY WITH ANESTHESIA;  Surgeon: Keysha Disla MD;  Location: [facility] ENDOSCOPY;  Service: Gastroenterology;  Laterality: N/A;  pre screen  post diverticulosis,polyp  Dr. Garcia    DENTAL PROCEDURE      implants x 2    MOLE REMOVAL      From nose     Family History    Problem Relation Age of Onset    Diabetes type II Father     Heart disease Father     Stroke Father              COPD Father     Diabetes Father     Hearing loss Father         Hearing issues on my father's side of the family    Breast cancer Mother     Migraines Mother     Cancer Mother     Melanoma Sister     Hearing loss Sister         tumor    Multiple sclerosis Sister     Thyroid disease Maternal Grandmother     Dementia Maternal Grandmother     Colon polyps Maternal Uncle         Unknown age    Alcohol abuse Maternal Uncle     Drug abuse Maternal Uncle     Colon cancer Neg Hx     Esophageal cancer Neg Hx     Liver disease Neg Hx     Ulcerative colitis Neg Hx     Rectal cancer Neg Hx     Stomach cancer Neg Hx     Ovarian cancer Neg Hx     Uterine cancer Neg Hx      Social History     Socioeconomic History    Marital status:    Tobacco Use    Smoking status: Former     Current packs/day: 0.00     Average packs/day: 0.5 packs/day for 22.3 years (11.2 ttl pk-yrs)     Types: Cigarettes     Start date: 1977     Quit date: 1999     Years since quittin.6    Smokeless tobacco: Never    Tobacco comments:        Vaping Use    Vaping status: Never Used   Substance and Sexual Activity    Alcohol use: Yes     Comment: Drink every evening, amounts and type vary.    Drug use: Never    Sexual activity: Not Currently     Partners: Male     Birth control/protection: Post-menopausal     Allergies   Allergen Reactions    Morphine Itching and Swelling           Topamax [Topiramate] Confusion     Brain fog, increased somnolence    Flonase [Fluticasone] Other (See Comments)     Blisters around nose     Current Outpatient Medications   Medication Sig Dispense Refill    cetirizine (zyrTEC) 10 MG tablet Take 1 tablet by mouth Daily.      ibuprofen (ADVIL,MOTRIN) 200 MG tablet Take 1 tablet by mouth Every 6 (Six) Hours As Needed.      primidone (MYSOLINE) 50 MG tablet Take 3 tablets by mouth Every  Night. 270 tablet 3    sertraline (ZOLOFT) 25 MG tablet TAKE 1 TABLET BY MOUTH DAILY. ALONG WITH THE 50 MG SERTRALINE FOR MOOD. TAKE WITH FOOD. 90 tablet 0    sertraline (ZOLOFT) 50 MG tablet Take 1 tablet by mouth Daily. Along with the 25 mg sertraline pill for mood.  Take with food. 90 tablet 0    vitamin D (ERGOCALCIFEROL) 1.25 MG (43629 UT) capsule capsule Take 1 capsule by mouth 1 (One) Time Per Week. 12 capsule 3     No current facility-administered medications for this visit.     Review of Systems   Constitutional:  Negative for chills and fever.   HENT:  Negative for congestion.    Respiratory:  Negative for shortness of breath.    Cardiovascular:  Negative for chest pain and leg swelling.   Gastrointestinal:  Negative for constipation, diarrhea, nausea and vomiting.   Musculoskeletal: Negative.         Foot pain     Skin:  Negative for wound.   Neurological:  Negative for numbness.       OBJECTIVE     Vitals:    07/09/24 1333   BP: 144/82   Pulse: 68   SpO2: 98%         PHYSICAL EXAM  GEN:   Accompanied by none.     Foot/Ankle Exam    GENERAL  Appearance:  appears stated age  Orientation:  AAOx3  Affect:  appropriate  Gait:  unimpaired  Assistance:  independent  Right shoe gear: CAM boot  Left shoe gear: casual shoe    VASCULAR     Right Foot Vascularity   Normal vascular exam    Dorsalis pedis:  2+  Posterior tibial:  2+  Skin temperature:  warm  Edema grading:  None  CFT:  < 3 seconds  Pedal hair growth:  Present  Varicosities:  none     Left Foot Vascularity   Normal vascular exam    Dorsalis pedis:  2+  Posterior tibial:  2+  Skin temperature:  warm  Edema grading:  None  CFT:  < 3 seconds  Pedal hair growth:  Present  Varicosities:  none     NEUROLOGIC     Right Foot Neurologic   Light touch sensation: normal  Vibratory sensation: normal  Hot/Cold sensation: normal  Protective Sensation using [place]-David Monofilament:   Sites intact: 10  Sites tested: 10     Left Foot Neurologic   Light touch  sensation: normal  Vibratory sensation: normal  Hot/Cold sensation:  normal  Protective Sensation using [place]-David Monofilament:   Sites intact: 10  Sites tested: 10    MUSCULOSKELETAL     Right Foot Musculoskeletal   Ecchymosis:  none  Tenderness:  plantar arch tenderness and plantar fascia tenderness    Arch:  Normal     Left Foot Musculoskeletal   Ecchymosis:  none  Tenderness:  none  Arch:  Normal    MUSCLE STRENGTH     Right Foot Muscle Strength   Foot dorsiflexion:  5  Foot plantar flexion:  5  Foot inversion:  5  Foot eversion:  5     Left Foot Muscle Strength   Foot dorsiflexion:  5  Foot plantar flexion:  5  Foot inversion:  5  Foot eversion:  5    DERMATOLOGIC      Right Foot Dermatologic   Skin  Right foot skin is intact.      Left Foot Dermatologic   Skin  Left foot skin is intact.       RADIOLOGY/NUCLEAR:  No results found.    LABORATORY/CULTURE RESULTS:      PATHOLOGY RESULTS:       ASSESSMENT/PLAN     Diagnoses and all orders for this visit:    1. Plantar fasciitis, right (Primary)  -     Injection Tendon or Ligament  -     lidocaine (XYLOCAINE) 2% injection 1.5 mL  -     dexAMETHasone (DECADRON) injection 1 mg  -     triamcinolone acetonide (KENALOG-40) injection 20 mg    2. Pain of right heel    3. Calcaneal spur of right foot        Comprehensive lower extremity examination and evaluation was performed.  Discussed findings and treatment plan including risks, benefits, and treatment options with patient in detail. Patient agreed with treatment plan.  After written consent obtained, plantar fascial injection performed as documented in procedure note. Post-procedure instructions given.  Conservative therapy including daily stretching (demonstrated proper way of performing), icing 3x daily, decreased activity, and nsaids PRN.   May continue with CAM if desired but not necessary.  Patient will continue stretching exercises at home and she defers Physical Therapy order at this time.   An After  Visit Summary was printed and given to the patient at discharge, including (if requested) any available informative/educational handouts regarding diagnosis, treatment, or medications. All questions were answered to patient/family satisfaction. Should symptoms fail to improve or worsen they agree to call or return to clinic or to go to the Emergency Department. Discussed the importance of following up with any needed screening tests/labs/specialist appointments and any requested follow-up recommended by me today. Importance of maintaining follow-up discussed and patient accepts that missed appointments can delay diagnosis and potentially lead to worsening of conditions.  Return in about 1 month (around 8/9/2024) for Recheck, Follow-up with Podiatry Provider., or sooner if acute issues arise.      Injection Tendon or Ligament    Date/Time: 7/9/2024 2:11 PM    Performed by: Yoshi Rosales DPM  Authorized by: Yoshi Rosales DPM  Preparation: Patient was prepped and draped in the usual sterile fashion.  Local anesthesia used: yes  Anesthesia: local infiltration    Anesthesia:  Local anesthesia used: yes  Local Anesthetic: lidocaine 2% without epinephrine  Anesthetic total: 1.5 mL    Sedation:  Patient sedated: no    Patient tolerance: patient tolerated the procedure well with no immediate complications  Comments: 1cc Dexamethasone, 0.5cc Kenalog 40 - right heel           This document has been electronically signed by Yoshi Rosales DPM on July 9, 2024 17:42 CDT

## 2024-07-08 ENCOUNTER — TELEPHONE (OUTPATIENT)
Age: 64
End: 2024-07-08
Payer: COMMERCIAL

## 2024-07-09 ENCOUNTER — TELEPHONE (OUTPATIENT)
Age: 64
End: 2024-07-09
Payer: COMMERCIAL

## 2024-07-09 ENCOUNTER — OFFICE VISIT (OUTPATIENT)
Age: 64
End: 2024-07-09
Payer: COMMERCIAL

## 2024-07-09 VITALS
DIASTOLIC BLOOD PRESSURE: 82 MMHG | HEIGHT: 65 IN | OXYGEN SATURATION: 98 % | WEIGHT: 200 LBS | HEART RATE: 68 BPM | BODY MASS INDEX: 33.32 KG/M2 | SYSTOLIC BLOOD PRESSURE: 144 MMHG

## 2024-07-09 DIAGNOSIS — M72.2 PLANTAR FASCIITIS, RIGHT: Primary | ICD-10-CM

## 2024-07-09 DIAGNOSIS — M79.671 PAIN OF RIGHT HEEL: ICD-10-CM

## 2024-07-09 DIAGNOSIS — M77.31 CALCANEAL SPUR OF RIGHT FOOT: ICD-10-CM

## 2024-07-09 PROCEDURE — 20550 NJX 1 TENDON SHEATH/LIGAMENT: CPT | Performed by: PODIATRIST

## 2024-07-09 PROCEDURE — 99213 OFFICE O/P EST LOW 20 MIN: CPT | Performed by: PODIATRIST

## 2024-07-09 RX ORDER — TRIAMCINOLONE ACETONIDE 40 MG/ML
20 INJECTION, SUSPENSION INTRA-ARTICULAR; INTRAMUSCULAR ONCE
Status: COMPLETED | OUTPATIENT
Start: 2024-07-09 | End: 2024-07-09

## 2024-07-09 RX ORDER — LIDOCAINE HYDROCHLORIDE 20 MG/ML
1.5 INJECTION, SOLUTION INFILTRATION; PERINEURAL ONCE
Status: COMPLETED | OUTPATIENT
Start: 2024-07-09 | End: 2024-07-09

## 2024-07-09 RX ORDER — DEXAMETHASONE SODIUM PHOSPHATE 10 MG/ML
1 INJECTION INTRAMUSCULAR; INTRAVENOUS ONCE
Status: COMPLETED | OUTPATIENT
Start: 2024-07-09 | End: 2024-07-09

## 2024-07-09 RX ADMIN — LIDOCAINE HYDROCHLORIDE 1.5 ML: 20 INJECTION, SOLUTION INFILTRATION; PERINEURAL at 14:21

## 2024-07-09 RX ADMIN — DEXAMETHASONE SODIUM PHOSPHATE 1 MG: 10 INJECTION INTRAMUSCULAR; INTRAVENOUS at 14:21

## 2024-07-09 RX ADMIN — TRIAMCINOLONE ACETONIDE 20 MG: 40 INJECTION, SUSPENSION INTRA-ARTICULAR; INTRAMUSCULAR at 14:21

## 2024-07-25 DIAGNOSIS — G25.0 ESSENTIAL TREMOR: ICD-10-CM

## 2024-07-25 RX ORDER — PRIMIDONE 50 MG/1
100 TABLET ORAL NIGHTLY
Qty: 180 TABLET | Refills: 1 | Status: SHIPPED | OUTPATIENT
Start: 2024-07-25

## 2024-07-30 DIAGNOSIS — F32.A DEPRESSIVE DISORDER: ICD-10-CM

## 2024-08-05 NOTE — PROGRESS NOTES
[facility] - PODIATRY    Today's Date: 2024     Patient Name: VARUN Meyers  MRN: [id number]  CSN: [id number]  PCP: Solomon Edward DO  Referring Provider: No ref. provider found    SUBJECTIVE     Chief Complaint   Patient presents with    Follow-up     Solomon Edward DO-2024 1 MO FU FOR RECHECK WITH DR SALDAÑA -- Pt states that is she not being consistent about stretching and would like a referral to PT pain level being 4/10     HPI: VARUN Meyers, a 64 y.o.female, comes to clinic as a(n) established patient complaining of foot pain. Patient has h/o Depression, diverticulosis, plantar fasciitis, sleep apnea, tremors .  Since last visit, she relates she almost had full relief of pain for a few days with injection but recently she was on her feet more and started to have increased pain. Notes wearing shoes with supports. She does relate difficulty with stretching and requests PT. Patient is non-diabetic. Admits pain at 4/10 level, described as shooting and aching, and centered around Left heel . Relates previous treatment(s) including steroids, stretching . Denies any constitutional symptoms. No other pedal complaints at this time.    Past Medical History:   Diagnosis Date    Colon polyp 5 Years ago    Depression     Diverticulosis 5 years ago    Family history of colonic polyps     History of colon polyps     Low back pain     Obesity     Seasonal allergies     Sleep apnea     Never officially tested    Tremor      Past Surgical History:   Procedure Laterality Date     SECTION      X 2    COLONOSCOPY  5 years ago    I have one scheduled for Fri, 3/18    COLONOSCOPY N/A 2022    Procedure: COLONOSCOPY WITH ANESTHESIA;  Surgeon: Keysha Disla MD;  Location: [facility] ENDOSCOPY;  Service: Gastroenterology;  Laterality: N/A;  pre screen  post diverticulosis,polyp  Dr. Garcia    DENTAL PROCEDURE      implants x 2    MOLE REMOVAL      From nose     Family History    Problem Relation Age of Onset    Diabetes type II Father     Heart disease Father     Stroke Father              COPD Father     Diabetes Father     Hearing loss Father         Hearing issues on my father's side of the family    Breast cancer Mother     Migraines Mother     Cancer Mother     Melanoma Sister     Hearing loss Sister         tumor    Multiple sclerosis Sister     Thyroid disease Maternal Grandmother     Dementia Maternal Grandmother     Colon polyps Maternal Uncle         Unknown age    Alcohol abuse Maternal Uncle     Drug abuse Maternal Uncle     Colon cancer Neg Hx     Esophageal cancer Neg Hx     Liver disease Neg Hx     Ulcerative colitis Neg Hx     Rectal cancer Neg Hx     Stomach cancer Neg Hx     Ovarian cancer Neg Hx     Uterine cancer Neg Hx      Social History     Socioeconomic History    Marital status:    Tobacco Use    Smoking status: Former     Current packs/day: 0.00     Average packs/day: 0.5 packs/day for 22.3 years (11.2 ttl pk-yrs)     Types: Cigarettes     Start date: 1977     Quit date: 1999     Years since quittin.7    Smokeless tobacco: Never    Tobacco comments:        Vaping Use    Vaping status: Never Used   Substance and Sexual Activity    Alcohol use: Yes     Comment: Drink every evening, amounts and type vary.    Drug use: Never    Sexual activity: Not Currently     Partners: Male     Birth control/protection: Post-menopausal     Allergies   Allergen Reactions    Morphine Itching and Swelling           Topamax [Topiramate] Confusion     Brain fog, increased somnolence    Flonase [Fluticasone] Other (See Comments)     Blisters around nose     Current Outpatient Medications   Medication Sig Dispense Refill    cetirizine (zyrTEC) 10 MG tablet Take 1 tablet by mouth Daily.      ibuprofen (ADVIL,MOTRIN) 200 MG tablet Take 1 tablet by mouth Every 6 (Six) Hours As Needed.      primidone (MYSOLINE) 50 MG tablet TAKE 2 TABLETS BY MOUTH EVERY  NIGHT 180 tablet 1    sertraline (ZOLOFT) 25 MG tablet TAKE 1 TABLET BY MOUTH DAILY. ALONG WITH THE 50 MG SERTRALINE FOR MOOD. TAKE WITH FOOD. 90 tablet 0    sertraline (ZOLOFT) 50 MG tablet TAKE 1 TABLET BY MOUTH DAILY. ALONG WITH THE 25 MG SERTRALINE PILL FOR MOOD. TAKE WITH FOOD. 90 tablet 0    vitamin D (ERGOCALCIFEROL) 1.25 MG (91725 UT) capsule capsule Take 1 capsule by mouth 1 (One) Time Per Week. 12 capsule 3     No current facility-administered medications for this visit.     Review of Systems   Constitutional:  Negative for chills and fever.   HENT:  Negative for congestion.    Respiratory:  Negative for shortness of breath.    Cardiovascular:  Negative for chest pain and leg swelling.   Gastrointestinal:  Negative for constipation, diarrhea, nausea and vomiting.   Musculoskeletal: Negative.         Foot pain     Skin:  Negative for wound.   Neurological:  Negative for numbness.       OBJECTIVE     Vitals:    08/09/24 1028   BP: 140/90   Pulse: 99   Resp: 18   SpO2: 95%           PHYSICAL EXAM  GEN:   Accompanied by none.     Foot/Ankle Exam    GENERAL  Appearance:  appears stated age  Orientation:  AAOx3  Affect:  appropriate  Gait:  unimpaired  Assistance:  independent  Left shoe gear: sandal    VASCULAR     Right Foot Vascularity   Normal vascular exam    Dorsalis pedis:  2+  Posterior tibial:  2+  Skin temperature:  warm  Edema grading:  None  CFT:  < 3 seconds  Pedal hair growth:  Present  Varicosities:  none     Left Foot Vascularity   Normal vascular exam    Dorsalis pedis:  2+  Posterior tibial:  2+  Skin temperature:  warm  Edema grading:  None  CFT:  < 3 seconds  Pedal hair growth:  Present  Varicosities:  none     NEUROLOGIC     Right Foot Neurologic   Light touch sensation: normal  Vibratory sensation: normal  Hot/Cold sensation: normal  Protective Sensation using [place]-David Monofilament:   Sites intact: 10  Sites tested: 10     Left Foot Neurologic   Light touch sensation:  normal  Vibratory sensation: normal  Hot/Cold sensation:  normal  Protective Sensation using [place]-David Monofilament:   Sites intact: 10  Sites tested: 10    MUSCULOSKELETAL     Right Foot Musculoskeletal   Ecchymosis:  none  Tenderness:  plantar arch tenderness and plantar fascia tenderness    Arch:  Normal     Left Foot Musculoskeletal   Ecchymosis:  none  Tenderness:  none  Arch:  Normal    MUSCLE STRENGTH     Right Foot Muscle Strength   Foot dorsiflexion:  5  Foot plantar flexion:  5  Foot inversion:  5  Foot eversion:  5     Left Foot Muscle Strength   Foot dorsiflexion:  5  Foot plantar flexion:  5  Foot inversion:  5  Foot eversion:  5    DERMATOLOGIC      Right Foot Dermatologic   Skin  Right foot skin is intact.      Left Foot Dermatologic   Skin  Left foot skin is intact.       RADIOLOGY/NUCLEAR:  No results found.    LABORATORY/CULTURE RESULTS:      PATHOLOGY RESULTS:       ASSESSMENT/PLAN     Diagnoses and all orders for this visit:    1. Plantar fasciitis, right (Primary)  -     Ambulatory Referral to Physical Therapy    2. Pain of right heel    3. Calcaneal spur of right foot          Comprehensive lower extremity examination and evaluation was performed.  Discussed findings and treatment plan including risks, benefits, and treatment options with patient in detail. Patient agreed with treatment plan.  Conservative therapy including daily stretching (demonstrated proper way of performing), icing 3x daily, decreased activity, and nsaids PRN.   Continue supportive shoe gear.  Referral to PT to assist with stretching.  An After Visit Summary was printed and given to the patient at discharge, including (if requested) any available informative/educational handouts regarding diagnosis, treatment, or medications. All questions were answered to patient/family satisfaction. Should symptoms fail to improve or worsen they agree to call or return to clinic or to go to the Emergency Department. Discussed the  importance of following up with any needed screening tests/labs/specialist appointments and any requested follow-up recommended by me today. Importance of maintaining follow-up discussed and patient accepts that missed appointments can delay diagnosis and potentially lead to worsening of conditions.  Return in about 6 weeks (around 9/20/2024) for Follow-up with Podiatry Provider., or sooner if acute issues arise.      Procedures     This document has been electronically signed by Yoshi Rosales DPM on August 9, 2024 10:59 CDT

## 2024-08-08 ENCOUNTER — TELEPHONE (OUTPATIENT)
Age: 64
End: 2024-08-08
Payer: COMMERCIAL

## 2024-08-09 ENCOUNTER — OFFICE VISIT (OUTPATIENT)
Age: 64
End: 2024-08-09
Payer: COMMERCIAL

## 2024-08-09 VITALS
DIASTOLIC BLOOD PRESSURE: 90 MMHG | HEART RATE: 99 BPM | RESPIRATION RATE: 18 BRPM | WEIGHT: 199.6 LBS | SYSTOLIC BLOOD PRESSURE: 140 MMHG | OXYGEN SATURATION: 95 % | HEIGHT: 65 IN | BODY MASS INDEX: 33.26 KG/M2

## 2024-08-09 DIAGNOSIS — M77.31 CALCANEAL SPUR OF RIGHT FOOT: ICD-10-CM

## 2024-08-09 DIAGNOSIS — M72.2 PLANTAR FASCIITIS, RIGHT: Primary | ICD-10-CM

## 2024-08-09 DIAGNOSIS — M79.671 PAIN OF RIGHT HEEL: ICD-10-CM

## 2024-09-10 ENCOUNTER — HOSPITAL ENCOUNTER (OUTPATIENT)
Dept: PHYSICAL THERAPY | Facility: HOSPITAL | Age: 64
Setting detail: THERAPIES SERIES
Discharge: HOME | End: 2024-09-10
Payer: COMMERCIAL

## 2024-09-10 DIAGNOSIS — M72.2 PLANTAR FASCIITIS, RIGHT: ICD-10-CM

## 2024-09-10 PROCEDURE — 97161 PT EVAL LOW COMPLEX 20 MIN: CPT

## 2024-09-10 NOTE — THERAPY EVALUATION
Physical Therapy Initial Evaluation and Plan of Care  [address] [place] [address]    Patient: VARUN Meyers               : 1960  Visit Date: 9/10/2024  Referring practitioner: Yoshi Rosales DPM  Date of Initial Visit: 9/10/2024    Visit Diagnoses:    ICD-10-CM ICD-9-CM   1. Plantar fasciitis, right  M72.2 728.71     Past Medical History:   Diagnosis Date    Colon polyp 5 Years ago    Depression     Diverticulosis 5 years ago    Family history of colonic polyps     History of colon polyps     Low back pain     Obesity     Seasonal allergies     Sleep apnea     Never officially tested    Tremor      Past Surgical History:   Procedure Laterality Date     SECTION      X 2    COLONOSCOPY  5 years ago    I have one scheduled for Fri, 3/18    COLONOSCOPY N/A 2022    Procedure: COLONOSCOPY WITH ANESTHESIA;  Surgeon: Keysha Disla MD;  Location: [facility] ENDOSCOPY;  Service: Gastroenterology;  Laterality: N/A;  pre screen  post diverticulosis,polyp  Dr. Garcia    DENTAL PROCEDURE      implants x 2    MOLE REMOVAL      From nose         SUBJECTIVE     Subjective Evaluation    History of Present Illness  Mechanism of injury: Pt reports she has a bone spur on her R foot. She states this has caused inflammation and is affecting her whole foot. She reports this has been an ongoing problem since April/May. She was referred to Dr. Rosales and was put in a boot for about a month. She has been off the boot since sometime in July. She was then given a steroid shot that she feels helped but is starting to wear off.     She initially reported that her foot would hurt upon waking, but she would be able to stretch it out. Now, it hurts with increased activity. She can only tolerate her Kuru shoes. She would like to wear tennis shoes to walk       Patient Occupation: NA   Precautions and Work Restrictions: NAPain  Current pain ratin  At best  pain ratin  At worst pain ratin (since the shot; prior to the boot was 10)  Location: R plantar surface of the foot, heel and lateral aspect up towards the ball of the foot  Quality: dull ache (Pushing pain)  Relieving factors: rest and medications (advil)  Aggravating factors: ambulation and stairs  Progression: no change    Social Support  Lives in: multiple-level home (Uses 1& 2 floor regularly)  Lives with: spouse    Diagnostic Tests  X-ray: normal    Treatments  Previous treatment: injection treatment  Patient Goals  Patient goals for therapy: decreased pain and return to sport/leisure activities         Outcome Measure:   FAAM: 48  PT G-Codes  Outcome Measure Options: FAAM    OBJECTIVE     Objective          Strength/Myotome Testing     Additional Strength Details  Plantar flexion - able to raise onto toes 5 x with increase in pain on plantar aspect of foot    Tests     Right Ankle/Foot   Negative for eversion talar tilt, inversion talar tilt and windlass.         LE ROM   ANKLE L R     DF 95 95      155     Inversion 30 45     Eversion 30 15             GREAT TOE       Extension 90 100          Therapy Education/Self Care 39438   Education offered today HEP, POC, NOC   Medbride Code    Ongoing HEP   Access Code: [email or web address]  URL: [email or web address]  Date: 09/10/2024  Prepared by: Pebbles Peres    Exercises  - Seated Plantar Fascia Mobilization with Small Ball  - 1 x daily - 7 x weekly - 3 sets - 10 reps  - Seated Plantar Fascia Stretch  - 1 x daily - 7 x weekly - 3 sets - 10 reps  - Towel Scrunches  - 1 x daily - 7 x weekly - 3 sets - 10 reps  - Arch Lifting  - 1 x daily - 7 x weekly - 3 sets - 10 reps  - Seated Lesser Toes Extension  - 1 x daily - 7 x weekly - 3 sets - 10 reps  - Toe Spreading  - 1 x daily - 7 x weekly - 3 sets - 10 reps   Timed Minutes        Total Timed Treatment:     0   mins  Total Time of Visit:            45   mins    ASSESSMENT/PLAN     GOALS:  Goals                                           Progress Note due by 10/10/24                                                      Recert due by 12/09/24   [facility] by: 6 weeks Comments Date Status   Foot pain no greater than 2/10 on average      No increase in pain with ascending stairs      Improve FAAM score to 80% or greater      Able to wear tennis shoes for ambulation 500ft on uneven surface with pain no greater than 2/10      Maxx ROM of ankle symmetrical               Assessment & Plan       Assessment  Impairments: lacks appropriate home exercise program and pain with function   Functional limitations: walking and standing   Assessment details: Valerie is a 64 year old female with c/o plantar fasciitis type pain. She has been put in a boot previously and had a steroid shot that is wearing off. She continues to have pain. Today, she demonstrated good ROM in her ankles and had no increase in pain with great toe extension. She was tender with pressure at medial and lateral calcaneus and along the lateral plantar surface of her foot. With increased palpation pressure in arch, she did have recreation of pain as well. No tenderness noted throughout metatarsal bones nor ligaments. Provided her with HEP to work foot intrinsic muscles and provide self induced relieve of plantar fascia pain.   Prognosis: good    Plan  Therapy options: will be seen for skilled therapy services  Planned modality interventions: dry needling, cryotherapy, high voltage pulsed current (pain management), TENS and low level laser therapy  Planned therapy interventions: manual therapy, neuromuscular re-education, soft tissue mobilization, strengthening, stretching, therapeutic activities, joint mobilization, home exercise program, gait training, functional ROM exercises, flexibility, body mechanics training and balance/weight-bearing training  Frequency: 2x week  Duration in weeks: 8  Treatment plan discussed with: patient  Plan details: 2x/ week for 8 weeks with  decreased frequency once symptoms are under control. Start with manual IASTM and modalities to include scraping and laser/estim combo if appropriate. Assess response to provided HEP.         Anticipated CPT codes: Gait Training 22911, Therapeutic Exercise 97177, Manual Therapy 89186, Therapeutic Activity 17669, Neuromuscular ReEducation 77368, Self Care/Home Management 45690, Estim Attended 74850, and Estim Unattended (CMS)     SIGNATURE: Pebbles Peres PT,ERIC [place] License #: [id number]  Electronically Signed on 9/10/2024      Initial Certification  Certification Period: 9/10/2024 through 12/8/2024  I certify that the therapy services are furnished while this patient is under my care.  The services outlined above are required by this patient, and will be reviewed every 90 days.     PHYSICIAN: Yoshi Rosales DPM (NPI: [id number])    Signature____________________________________________DATE: _________     Please sign and return via fax to [phone].   @[email or web address]@          [facility] [place]. [id number]  [phone]

## 2024-09-13 ENCOUNTER — HOSPITAL ENCOUNTER (OUTPATIENT)
Dept: PHYSICAL THERAPY | Facility: HOSPITAL | Age: 64
Setting detail: THERAPIES SERIES
Discharge: HOME | End: 2024-09-13
Payer: COMMERCIAL

## 2024-09-13 DIAGNOSIS — M72.2 PLANTAR FASCIITIS, RIGHT: Primary | ICD-10-CM

## 2024-09-13 PROCEDURE — 97140 MANUAL THERAPY 1/> REGIONS: CPT

## 2024-09-13 PROCEDURE — 97110 THERAPEUTIC EXERCISES: CPT

## 2024-09-13 PROCEDURE — 97032 APPL MODALITY 1+ESTIM EA 15: CPT

## 2024-09-13 NOTE — THERAPY TREATMENT NOTE
Physical Therapy Treatment Note  [facility] Outpatient Therapy Services  A [address], [place] [address]    Patient: VARUN Meyers                                                 Visit Date: 2024  :     1960    Referring practitioner:    Yoshi Rosales DPM  Date of Initial Visit:          Type: THERAPY  Episode: plantar fasciitis R    Visit Diagnoses:    ICD-10-CM ICD-9-CM   1. Plantar fasciitis, right  M72.2 728.71     SUBJECTIVE     Subjective:  She is having pain since her eval.       PAIN: 5-6/10 Plantar surface R foot   No pain after treatment         OBJECTIVE     Objective     Modalities Comments   LaserStim combo chronic inflammation X 10 min to plantar surface/heel R foot, prone       Minutes 10        Therapeutic Exercises    40275 Units Comments   Reviewed HEP     Gave patient a pair of scaphoid pads to place in her shoes.                    Timed Minutes 15      Manual Therapy     20137  Comments   STM to R plantar fascia    IASTM using the stone tool R plantar fascia                Timed Minutes 20           Therapy Education/Self Care 13270   Education offered today HEP, POC, NOC   Medbridge Code     Ongoing HEP   Access Code: [email or web address]  URL: [email or web address]  Date: 2024  Prepared by: Teresa Rivero    Exercises  - Arch Lifting  - 2 x daily - 7 x weekly - 2 sets - 10 reps  - Seated Plantar Fascia Mobilization with Small Ball  - 2 x daily - 7 x weekly  - Towel Scrunches  - 2 x daily - 7 x weekly - 2 sets - 10 reps  - Seated Lesser Toes Extension  - 2 x daily - 7 x weekly - 1 sets - 10 reps  - Toe Spreading  - 2 x daily - 7 x weekly - 1 sets - 10 reps   Timed Minutes            Total Timed Treatment:     45   mins  Total Time of Visit:             45   mins         ASSESSMENT/PLAN     GOALS  Goals                                          Progress Note due by 10/10/24                                                       Recert due by 12/09/24   LTG by: 6 weeks Comments Date Status   Foot pain no greater than 2/10 on average  No pain after treatment 9/13 Ongoing   No increase in pain with ascending stairs         Improve FAAM score to 80% or greater         Able to wear tennis shoes for ambulation 500ft on uneven surface with pain no greater than 2/10         Maxx ROM of ankle symmetrical                      Anticipated CPT codes: Therapeutic Exercise 92381, Manual Therapy 55833, and Estim Attended [id number]      Assessment/Plan     ASSESSMENT:   Patient has two pairs of shoes that are comfortable for her to wear.  I did give her a pair of scaphoid pads as she need support for her arches.  She did not have any pain after today's treatment.     PLAN:   Use modalities and manual work for pain, progress foot and R LE strengthening.  Patient will need a printed copy of her HEP.    SIGNATURE: Teresa Rivero PTA, [place], [place] License #: [id number]  Electronically Signed on 9/13/2024        [address] [place] [address]. [id number]  [phone]

## 2024-09-17 ENCOUNTER — HOSPITAL ENCOUNTER (OUTPATIENT)
Dept: PHYSICAL THERAPY | Facility: HOSPITAL | Age: 64
Setting detail: THERAPIES SERIES
Discharge: HOME | End: 2024-09-17
Payer: COMMERCIAL

## 2024-09-17 DIAGNOSIS — M72.2 PLANTAR FASCIITIS, RIGHT: Primary | ICD-10-CM

## 2024-09-17 PROCEDURE — 97032 APPL MODALITY 1+ESTIM EA 15: CPT

## 2024-09-17 PROCEDURE — 97110 THERAPEUTIC EXERCISES: CPT

## 2024-09-17 PROCEDURE — 97140 MANUAL THERAPY 1/> REGIONS: CPT

## 2024-09-20 ENCOUNTER — APPOINTMENT (OUTPATIENT)
Dept: PHYSICAL THERAPY | Facility: HOSPITAL | Age: 64
End: 2024-09-20
Payer: COMMERCIAL

## 2024-09-24 ENCOUNTER — OFFICE VISIT (OUTPATIENT)
Age: 64
End: 2024-09-24
Payer: COMMERCIAL

## 2024-09-24 VITALS
DIASTOLIC BLOOD PRESSURE: 78 MMHG | SYSTOLIC BLOOD PRESSURE: 132 MMHG | BODY MASS INDEX: 32.95 KG/M2 | WEIGHT: 198 LBS | HEART RATE: 86 BPM | OXYGEN SATURATION: 97 %

## 2024-09-24 DIAGNOSIS — M77.31 CALCANEAL SPUR OF RIGHT FOOT: ICD-10-CM

## 2024-09-24 DIAGNOSIS — M72.2 PLANTAR FASCIITIS, RIGHT: Primary | ICD-10-CM

## 2024-09-24 DIAGNOSIS — M79.671 PAIN OF RIGHT HEEL: ICD-10-CM

## 2024-09-24 PROCEDURE — 99213 OFFICE O/P EST LOW 20 MIN: CPT | Performed by: PODIATRIST

## 2024-09-24 RX ORDER — BETAMETHASONE DIPROPIONATE 0.5 MG/G
CREAM TOPICAL
COMMUNITY
Start: 2024-08-05

## 2024-09-25 ENCOUNTER — HOSPITAL ENCOUNTER (OUTPATIENT)
Dept: PHYSICAL THERAPY | Facility: HOSPITAL | Age: 64
Setting detail: THERAPIES SERIES
Discharge: HOME | End: 2024-09-25
Payer: COMMERCIAL

## 2024-09-25 DIAGNOSIS — M72.2 PLANTAR FASCIITIS, RIGHT: Primary | ICD-10-CM

## 2024-09-25 PROCEDURE — 97140 MANUAL THERAPY 1/> REGIONS: CPT | Performed by: PHYSICAL THERAPIST

## 2024-09-25 PROCEDURE — 97110 THERAPEUTIC EXERCISES: CPT | Performed by: PHYSICAL THERAPIST

## 2024-09-25 PROCEDURE — 97535 SELF CARE MNGMENT TRAINING: CPT | Performed by: PHYSICAL THERAPIST

## 2024-09-27 ENCOUNTER — HOSPITAL ENCOUNTER (OUTPATIENT)
Dept: PHYSICAL THERAPY | Facility: HOSPITAL | Age: 64
Setting detail: THERAPIES SERIES
Discharge: HOME | End: 2024-09-27
Payer: COMMERCIAL

## 2024-09-27 DIAGNOSIS — M72.2 PLANTAR FASCIITIS, RIGHT: Primary | ICD-10-CM

## 2024-09-27 PROCEDURE — 97140 MANUAL THERAPY 1/> REGIONS: CPT

## 2024-09-27 PROCEDURE — 97112 NEUROMUSCULAR REEDUCATION: CPT

## 2024-09-27 PROCEDURE — 97032 APPL MODALITY 1+ESTIM EA 15: CPT

## 2024-10-01 ENCOUNTER — HOSPITAL ENCOUNTER (OUTPATIENT)
Dept: PHYSICAL THERAPY | Facility: HOSPITAL | Age: 64
Setting detail: THERAPIES SERIES
Discharge: HOME | End: 2024-10-01
Payer: COMMERCIAL

## 2024-10-01 DIAGNOSIS — M72.2 PLANTAR FASCIITIS, RIGHT: Primary | ICD-10-CM

## 2024-10-01 PROCEDURE — 97140 MANUAL THERAPY 1/> REGIONS: CPT

## 2024-10-01 PROCEDURE — 97112 NEUROMUSCULAR REEDUCATION: CPT

## 2024-10-01 PROCEDURE — 97032 APPL MODALITY 1+ESTIM EA 15: CPT

## 2024-10-01 NOTE — THERAPY TREATMENT NOTE
Physical Therapy Treatment Note  [facility] Outpatient Therapy Services  A department [address], [place] [address]    Patient: VARUN Meyers                                                 Visit Date: 10/1/2024  :     1960    Referring practitioner:    Yoshi Rosales DPM  Date of Initial Visit:          Type: THERAPY  Episode: plantar fasciitis R  Number of visits this episode: 6    Visit Diagnoses:    ICD-10-CM ICD-9-CM   1. Plantar fasciitis, right  M72.2 728.71       SUBJECTIVE     Subjective:Pt reports that her foot is better in general. Since getting an insert , she has felt immensely better. She started wearing normal shoes this week and reports that it did not increase her pain      PAIN: 2-310 in R         OBJECTIVE     Objective     Modalities Comments   LaserStim combo chronic inflammation X 10 min to plantar surface/heel R foot reclined      Tx:4   Minutes 10       Manual Therapy     94631  Comments   R first MTP AP mobilization Pt can actively lift toe visually ~60 + degrees   STM to plantar aspect of R foot                Timed Minutes 13     Neuromuscular Reeducation     54223 Comments   Foam standing SLS with YTB around mid section of foot with varying perturbations     Foam surface on AP board with forward and backward weight shifts                 Timed Minutes 15          Therapy Education/Self Care 57797   Education offered today    Medbride Code    Ongoing HEP   Access Code: [email or web address]  URL: [email or web address]  Date: 2024  Prepared by: Teresa Rivero     Exercises  - Arch Lifting  - 2 x daily - 7 x weekly - 2 sets - 10 reps  - Seated Plantar Fascia Mobilization with Small Ball  - 2 x daily - 7 x weekly  - Towel Scrunches  - 2 x daily - 7 x weekly - 2 sets - 10 reps  - Seated Lesser Toes Extension  - 2 x daily - 7 x weekly - 1 sets - 10 reps  - Toe Spreading  - 2 x daily - 7 x weekly - 1 sets - 10 reps   Timed Minutes        Total  Timed Treatment:     38   mins  Total Time of Visit:             38  mins         ASSESSMENT/PLAN     GOALS  Goals                                          Progress Note due by 10/10/24                                                      Recert due by 12/09/24   LTG by: 6 weeks Comments Date Status   Foot pain no greater than 2/10 on average  No pain after treatment 9/13 Ongoing   No increase in pain with ascending stairs  reports not an increase in pain  9/27  Ongoing   Improve FAAM score to 80% or greater         Able to wear tennis shoes for ambulation 500ft on uneven surface with pain no greater than 2/10  pain 3/10 today 9/25 ongoing   Maxx ROM of ankle symmetrical  passive right DF 9 deg 9/25 ongoing     Anticipated CPT codes: Gait Training 56553, Therapeutic Exercise 70990, Manual Therapy 92489, Therapeutic Activity 10408, Neuromuscular ReEducation 71408, Self Care/Home Management 76887, and Estim Attended 74311      Assessment/Plan     ASSESSMENT:   Pt has improved gait since having an insert placed in her shoe. She has been able to ambulate in the community because of this. SLS with YTB did recreate the pain she felt on the lateral aspect of her foot. I do think this activity is beneficial, may need to reduce intensity of it at this time.     PLAN:   PT WILL BE OUT OF TOWN NEXT WEEK. DO PROGRESS NOTE. Continue to work on improving her ankle flexibility, control, and gait mechanics.    SIGNATURE: Pebbles Celaya PT DPT, [place] License #: [id number]  Electronically Signed on 10/1/2024        [place]. [id number]  [phone]

## 2024-10-03 ENCOUNTER — APPOINTMENT (OUTPATIENT)
Dept: PHYSICAL THERAPY | Facility: HOSPITAL | Age: 64
End: 2024-10-03
Payer: COMMERCIAL

## 2024-10-08 ENCOUNTER — APPOINTMENT (OUTPATIENT)
Dept: PHYSICAL THERAPY | Facility: HOSPITAL | Age: 64
End: 2024-10-08
Payer: COMMERCIAL

## 2024-10-10 ENCOUNTER — APPOINTMENT (OUTPATIENT)
Dept: PHYSICAL THERAPY | Facility: HOSPITAL | Age: 64
End: 2024-10-10
Payer: COMMERCIAL

## 2024-10-11 ENCOUNTER — HOSPITAL ENCOUNTER (OUTPATIENT)
Dept: PHYSICAL THERAPY | Facility: HOSPITAL | Age: 64
Setting detail: THERAPIES SERIES
Discharge: HOME | End: 2024-10-11
Payer: COMMERCIAL

## 2024-10-11 DIAGNOSIS — M72.2 PLANTAR FASCIITIS, RIGHT: Primary | ICD-10-CM

## 2024-10-11 PROCEDURE — 97140 MANUAL THERAPY 1/> REGIONS: CPT

## 2024-10-11 PROCEDURE — 97032 APPL MODALITY 1+ESTIM EA 15: CPT

## 2024-10-11 PROCEDURE — 97110 THERAPEUTIC EXERCISES: CPT

## 2024-10-11 NOTE — THERAPY TREATMENT NOTE
Physical Therapy Treatment Note and 30 Day Progress Note  [facility] Outpatient Therapy Services  A department [address], [place] [address]    Patient: VARUN Meyers                                                 Visit Date: 10/11/2024  :     1960    Referring practitioner:    Yoshi Rosales DPM  Date of Initial Visit:          Type: THERAPY  Episode: plantar fasciitis R  Number of visits this episode: 7    Visit Diagnoses:    ICD-10-CM ICD-9-CM   1. Plantar fasciitis, right  M72.2 728.71       SUBJECTIVE     Subjective:Pt reports that the SLS exercise that caused her pain last visit has made her feel like she has taken a major step back. She was in pain most of her trip.       PAIN: 2-3/10 in R when sitting but instantly increases in pain with WB'ing         OBJECTIVE     Objective     Modalities Comments   LaserStim combo chronic inflammation X 10 min to plantar surface/heel R foot reclined      Tx:4   Minutes 10       Manual Therapy     74034  Comments   STM to plantar aspect of R foot, heel, and lateral side of foot Massage cream                    Timed Minutes 17     Therapeutic Exercises    82358 Units Comments   Assess goals for PN  FAAM, walking 500 ft, stairs, ankle ROM   DF stretching                    Timed Minutes 15                Therapy Education/Self Care 52526   Education offered today    Medbride Code    Ongoing HEP   Access Code: [email or web address]  URL: [email or web address]  Date: 2024  Prepared by: Teresa Rivero     Exercises  - Arch Lifting  - 2 x daily - 7 x weekly - 2 sets - 10 reps  - Seated Plantar Fascia Mobilization with Small Ball  - 2 x daily - 7 x weekly  - Towel Scrunches  - 2 x daily - 7 x weekly - 2 sets - 10 reps  - Seated Lesser Toes Extension  - 2 x daily - 7 x weekly - 1 sets - 10 reps  - Toe Spreading  - 2 x daily - 7 x weekly - 1 sets - 10 reps   Timed Minutes        Total Timed Treatment:     42   mins  Total Time of  Visit:             42  mins         ASSESSMENT/PLAN     GOALS  Goals                                          Progress Note due by 11/10/24                                                      Recert due by 12/09/24   LTG by: 6 weeks Comments Date Status   Foot pain no greater than 2/10 on average 5-6/10 since previous treatment 10/11 Ongoing   No increase in pain with ascending stairs No increase in pain; stayed at 3/10 before and after stairs  10/11 MET   Improve FAAM score to 80% or greater  IE: 48/84  10/11: 41/84  10/11 Ongoing   Able to wear tennis shoes for ambulation 500ft on uneven surface with pain no greater than 2/10  Started at a 4, decreased to 3/10 after 500 ft 10/11 ongoing   Maxx ROM of ankle symmetrical 3 deg past neutral bilaterally, compared to 5 deg past neutral at IE 10/11 ongoing     Anticipated CPT codes: Gait Training 05837, Therapeutic Exercise 74316, Manual Therapy 19864, Therapeutic Activity 71643, Neuromuscular ReEducation 92243, Self Care/Home Management 68428, and Estim Attended 26725      Assessment/Plan     Assessment  Impairments: lacks appropriate home exercise program and pain with function   Functional limitations: walking and standing   Prognosis: good     Plan  Therapy options: will be seen for skilled therapy services  Planned modality interventions: dry needling, cryotherapy, high voltage pulsed current (pain management), TENS and low level laser therapy  Planned therapy interventions: manual therapy, neuromuscular re-education, soft tissue mobilization, strengthening, stretching, therapeutic activities, joint mobilization, home exercise program, gait training, functional ROM exercises, flexibility, body mechanics training and balance/weight-bearing training  Frequency: 2x week  Duration in weeks: 8  Treatment plan discussed with: patient    ASSESSMENT:   All goals were assessed today for PN today. She has only met her stairs pain goal at this time. She reported that she was  feeling like she had improved immensely until previous visit, as the pain from the SLS activity has not gone away. She felt like she has lost DF ROM compared to previous visits, and she can no longer walk barefoot. After assessing goals for the progress note, we focused on manual techniques and modalities to decrease her plantar fascia pain. It felt as if adhesions were present in the plantar fascia, and the pt reported that it felt like bubbles were popping in her foot whenever my finger ran over the plantar fascia. Following manual treatment and modalities, she reported the greatest relief while weightbearing that she has felt since her last visit over a week ago. Pt is appropriate for continued skilled PT services in order to continue decreasing her pain and promoting more tolerance to weightbearing and control with gait to improve her overall quality of life.       PLAN:   Work on reducing pain, as tenderness over plantar fascia has returned. Continue to work on improving her ankle flexibility, control, and gait mechanics.    SIGNATURE: Jeanne Allison PT [facility], [place] License #:   Electronically Signed on 10/11/2024    The clinical instructor and/or supervising staff, Sixto Maza PT, was present in clinic guiding the visit by approving, concurring, and confirming the skilled judgement for all services rendered.    Signature:  Sixto Maza PT, [place] License #: [id number]  Electronically signed on 10/11/2024        [place]. [id number]  [phone]

## 2024-10-15 ENCOUNTER — HOSPITAL ENCOUNTER (OUTPATIENT)
Dept: PHYSICAL THERAPY | Facility: HOSPITAL | Age: 64
Setting detail: THERAPIES SERIES
Discharge: HOME | End: 2024-10-15
Payer: COMMERCIAL

## 2024-10-15 DIAGNOSIS — M72.2 PLANTAR FASCIITIS, RIGHT: Primary | ICD-10-CM

## 2024-10-15 PROCEDURE — 97110 THERAPEUTIC EXERCISES: CPT

## 2024-10-15 PROCEDURE — 97032 APPL MODALITY 1+ESTIM EA 15: CPT

## 2024-10-15 PROCEDURE — 97140 MANUAL THERAPY 1/> REGIONS: CPT

## 2024-10-17 ENCOUNTER — HOSPITAL ENCOUNTER (OUTPATIENT)
Dept: PHYSICAL THERAPY | Facility: HOSPITAL | Age: 64
Setting detail: THERAPIES SERIES
End: 2024-10-17
Payer: COMMERCIAL

## 2024-10-17 DIAGNOSIS — M72.2 PLANTAR FASCIITIS, RIGHT: Primary | ICD-10-CM

## 2024-10-29 ENCOUNTER — HOSPITAL ENCOUNTER (OUTPATIENT)
Dept: PHYSICAL THERAPY | Facility: HOSPITAL | Age: 64
Setting detail: THERAPIES SERIES
Discharge: HOME | End: 2024-10-29
Payer: COMMERCIAL

## 2024-10-29 DIAGNOSIS — M72.2 PLANTAR FASCIITIS, RIGHT: Primary | ICD-10-CM

## 2024-10-29 PROCEDURE — 97112 NEUROMUSCULAR REEDUCATION: CPT

## 2024-10-29 PROCEDURE — 97140 MANUAL THERAPY 1/> REGIONS: CPT

## 2024-10-29 PROCEDURE — 97032 APPL MODALITY 1+ESTIM EA 15: CPT

## 2024-10-29 NOTE — THERAPY TREATMENT NOTE
"Physical Therapy Treatment Note  [facility] Outpatient Therapy Services  A department [address], [place] [address]    Patient: VARUN Meyers                                                 Visit Date: 10/29/2024  :     1960    Referring practitioner:    Yoshi Rosales DPM  Date of Initial Visit:          Type: THERAPY  Episode: plantar fasciitis R  Number of visits this episode: 9    Visit Diagnoses:    ICD-10-CM ICD-9-CM   1. Plantar fasciitis, right  M72.2 728.71     SUBJECTIVE     Subjective:\"As long as I wear my inserts I'm good.\"      PAIN: 310         OBJECTIVE     Objective     Modalities Comments   LaserStim combo chronic inflammation  X 10 min to plantar surface/heel R foot reclined      Tx: 7   Minutes 10       Manual Therapy     08486  Comments   DFR R plantar surface                     Timed Minutes 10         Therapeutic Exercises    70025 Units Comments   MMT B hip abd and ext                         Timed Minutes 5      LE MMT   HIP Strength L Strength R   flexion     extension 5/5 5/5   ABD 5/5 5/5   ADD      IR     ER          KNEE     flexion     extension           ANKLE     dorsiflexion     plantarflexion     eversion     inversion     *pain   Neuromuscular Reeducation     91168 Comments   Limits of Stability testing on Neurocom                    Timed Minutes 15      Therapy Education/Self Care 28658   Education offered today    Medbride Code    Ongoing HEP   Access Code: [email or web address]  URL: [email or web address]  Date: 2024  Prepared by: Teresa Rivero     Exercises  - Arch Lifting  - 2 x daily - 7 x weekly - 2 sets - 10 reps  - Seated Plantar Fascia Mobilization with Small Ball  - 2 x daily - 7 x weekly  - Towel Scrunches  - 2 x daily - 7 x weekly - 2 sets - 10 reps  - Seated Lesser Toes Extension  - 2 x daily - 7 x weekly - 1 sets - 10 reps  - Toe Spreading  - 2 x daily - 7 x weekly - 1 sets - 10 reps   Timed Minutes        Total " Timed Treatment:     40   mins  Total Time of Visit:             40   mins         ASSESSMENT/PLAN     GOALS  Goals                                          Progress Note due by 11/10/24                                                      Recert due by 12/09/24   LTG by: 6 weeks Comments Date Status   Foot pain no greater than 2/10 on average 53/10 today 10/29 Ongoing   No increase in pain with ascending stairs No increase in pain; stayed at 3/10 before and after stairs  10/11 MET   Improve FAAM score to 80% or greater  IE: 48/84  10/11: 41/84  10/11 Ongoing   Able to wear tennis shoes for ambulation 500ft on uneven surface with pain no greater than 2/10  Started at a 4, decreased to 3/10 after 500 ft 10/11 ongoing   Maxx ROM of ankle symmetrical 3 deg past neutral bilaterally, compared to 5 deg past neutral at IE 10/11 ongoing     Anticipated CPT codes: Gait Training 13112, Therapeutic Exercise 82954, Manual Therapy 13310, Therapeutic Activity 35688, Neuromuscular ReEducation 49015, Self Care/Home Management 04862, and Estim Attended 09859      Assessment/Plan     ASSESSMENT:   Neurocom testing revealed some slight deficits especially with anterior WS'ing as well as we need to shore up her reaction times so that they are equal.    PLAN:   Consider performing A<>P  and L<>R WS'ing training on 1/2 foam, wobble board, and Fitter    SIGNATURE: Candelario Echavarria [facility], [place] License #: [id number]  Electronically Signed on 10/29/2024        [address] [place] [address]. [address]  [phone]

## 2024-10-31 ENCOUNTER — HOSPITAL ENCOUNTER (OUTPATIENT)
Dept: PHYSICAL THERAPY | Facility: HOSPITAL | Age: 64
Setting detail: THERAPIES SERIES
Discharge: HOME | End: 2024-10-31
Payer: COMMERCIAL

## 2024-10-31 DIAGNOSIS — M72.2 PLANTAR FASCIITIS, RIGHT: Primary | ICD-10-CM

## 2024-10-31 PROCEDURE — 97112 NEUROMUSCULAR REEDUCATION: CPT

## 2024-10-31 PROCEDURE — 97140 MANUAL THERAPY 1/> REGIONS: CPT

## 2024-10-31 NOTE — THERAPY TREATMENT NOTE
Physical Therapy Treatment Note  [facility]  [address], [place] [address]    Patient: VARUN Meyers                                                 Visit Date: 10/31/2024  :     1960    Referring practitioner:    Yoshi Rosales DPM  Date of Initial Visit:          Type: THERAPY  Episode: plantar fasciitis R  Number of visits this episode: 10    Visit Diagnoses:    ICD-10-CM ICD-9-CM   1. Plantar fasciitis, right  M72.2 728.71     SUBJECTIVE     Subjective:No new complaints      PAIN: 2/10         OBJECTIVE     Objective     Neuromuscular Reeducation     07356 Comments   A<>P WS'ing on 1/2 foam both sides, Fitter L1 and wobble board     L<>R WS'ing on Fitter L1 and wobble board                 Timed Minutes 32        Manual Therapy     30420  Comments   Tear drop taping R plantar surface with cover roll and Leukotape    IASTM R gastroc with Edge tool                Timed Minutes 13         Therapy Education/Self Care 32793   Education offered today    [employer] Code    Ongoing HEP   Access Code: [email or web address]  URL: [email or web address]  Date: 2024  Prepared by: Teresa Rivero     Exercises  - Arch Lifting  - 2 x daily - 7 x weekly - 2 sets - 10 reps  - Seated Plantar Fascia Mobilization with Small Ball  - 2 x daily - 7 x weekly  - Towel Scrunches  - 2 x daily - 7 x weekly - 2 sets - 10 reps  - Seated Lesser Toes Extension  - 2 x daily - 7 x weekly - 1 sets - 10 reps  - Toe Spreading  - 2 x daily - 7 x weekly - 1 sets - 10 reps   Timed Minutes        Total Timed Treatment:     45   mins  Total Time of Visit:             45   mins         ASSESSMENT/PLAN     GOALS  Goals                                          Progress Note due by 11/10/24                                                      Recert due by 24   LTG by: 6 weeks Comments Date Status   Foot pain no greater than 2/10 on average 2/10 today 10/31 Ongoing    No increase in pain with ascending stairs No increase in pain; stayed at 3/10 before and after stairs  10/11 MET   Improve FAAM score to 80% or greater  IE: 48/84  10/11: 41/84  10/11 Ongoing   Able to wear tennis shoes for ambulation 500ft on uneven surface with pain no greater than 2/10  Started at a 4, decreased to 3/10 after 500 ft 10/11 ongoing   Maxx ROM of ankle symmetrical 3 deg past neutral bilaterally, compared to 5 deg past neutral at IE 10/11 ongoing     Anticipated CPT codes: Gait Training 13717, Therapeutic Exercise 74764, Manual Therapy 84113, Therapeutic Activity 03998, Neuromuscular ReEducation 73083, Self Care/Home Management 48963, and Estim Attended 21561      Assessment/Plan     ASSESSMENT:   At first she struggled with the WS'ing activities but she quickly became more proficient.    PLAN:   Continue to progress proprioceptive activities.    SIGNATURE: Candelario [facility], [place] License #: [id number]  Electronically Signed on 10/31/2024        [address] [place] [address]. [address]  [phone]

## 2024-11-01 NOTE — PROGRESS NOTES
[facility] - PODIATRY    Today's Date: 2024     Patient Name: VARUN Meyers  MRN: [id number]  CSN: [id number]  PCP: Solomon Edward DO  Referring Provider: No ref. provider found    SUBJECTIVE     Chief Complaint   Patient presents with    Follow-up     Solomon Edward DO-2024 Return in about 6 weeks- pt states foot was doing better but has been doing Ptand seems to have made it worse now, not seeming to help much, pain comes and goes- pt pain 8/10 at worst      HPI: VARUN Meyers, a 64 y.o.female, comes to clinic as a(n) established patient complaining of foot pain. Patient has h/o Depression, diverticulosis, plantar fasciitis, sleep apnea, tremors .  Patient is non-diabetic. Since last visit, she relates continued discomfort to her right foot/heel. She notes improvement when wearing arch supports. Has been doing PT and notes occasional increased pain with some treatments. Admits pain at 8/10 level, described as shooting and aching, and centered around right heel . Relates previous treatment(s) including steroids, stretching . Denies any constitutional symptoms. No other pedal complaints at this time.    Past Medical History:   Diagnosis Date    Colon polyp 5 Years ago    Depression     Diverticulosis 5 years ago    Family history of colonic polyps     History of colon polyps     Low back pain     Obesity     Seasonal allergies     Sleep apnea     Never officially tested    Tremor      Past Surgical History:   Procedure Laterality Date     SECTION      X 2    COLONOSCOPY  5 years ago    I have one scheduled for Fri, 3/18    COLONOSCOPY N/A 2022    Procedure: COLONOSCOPY WITH ANESTHESIA;  Surgeon: Keysha Disla MD;  Location: [facility] ENDOSCOPY;  Service: Gastroenterology;  Laterality: N/A;  pre screen  post diverticulosis,polyp  Dr. Garcia    DENTAL PROCEDURE      implants x 2    MOLE REMOVAL      From nose     Family History   Problem Relation Age of  Onset    Diabetes type II Father     Heart disease Father     Stroke Father              COPD Father     Diabetes Father     Hearing loss Father         Hearing issues on my father's side of the family    Breast cancer Mother     Migraines Mother     Cancer Mother     Melanoma Sister     Hearing loss Sister         tumor    Multiple sclerosis Sister     Thyroid disease Maternal Grandmother     Dementia Maternal Grandmother     Colon polyps Maternal Uncle         Unknown age    Alcohol abuse Maternal Uncle     Drug abuse Maternal Uncle     Colon cancer Neg Hx     Esophageal cancer Neg Hx     Liver disease Neg Hx     Ulcerative colitis Neg Hx     Rectal cancer Neg Hx     Stomach cancer Neg Hx     Ovarian cancer Neg Hx     Uterine cancer Neg Hx      Social History     Socioeconomic History    Marital status:    Tobacco Use    Smoking status: Former     Current packs/day: 0.00     Average packs/day: 0.5 packs/day for 22.3 years (11.2 ttl pk-yrs)     Types: Cigarettes     Start date: 1977     Quit date: 1999     Years since quittin.9    Smokeless tobacco: Never    Tobacco comments:        Vaping Use    Vaping status: Never Used   Substance and Sexual Activity    Alcohol use: Yes     Comment: Drink every evening, amounts and type vary.    Drug use: Never    Sexual activity: Not Currently     Partners: Male     Birth control/protection: Post-menopausal     Allergies   Allergen Reactions    Morphine Itching and Swelling           Topamax [Topiramate] Confusion     Brain fog, increased somnolence    Flonase [Fluticasone] Other (See Comments)     Blisters around nose     Current Outpatient Medications   Medication Sig Dispense Refill    betamethasone dipropionate (DIPROSONE) 0.05 % cream APPLY TO AFFECTED AREA ON HANDS TWICE A DAY FOR 2 WEEKS      cetirizine (zyrTEC) 10 MG tablet Take 1 tablet by mouth Daily.      ibuprofen (ADVIL,MOTRIN) 200 MG tablet Take 1 tablet by mouth Every 6  (Six) Hours As Needed.      primidone (MYSOLINE) 50 MG tablet TAKE 2 TABLETS BY MOUTH EVERY NIGHT 180 tablet 1    sertraline (ZOLOFT) 25 MG tablet TAKE 1 TABLET BY MOUTH DAILY. ALONG WITH THE 50 MG SERTRALINE FOR MOOD. TAKE WITH FOOD. 90 tablet 0    sertraline (ZOLOFT) 50 MG tablet TAKE 1 TABLET BY MOUTH DAILY. ALONG WITH THE 25 MG SERTRALINE PILL FOR MOOD. TAKE WITH FOOD. 90 tablet 0    vitamin D (ERGOCALCIFEROL) 1.25 MG (27190 UT) capsule capsule Take 1 capsule by mouth 1 (One) Time Per Week. 12 capsule 3     No current facility-administered medications for this visit.     Review of Systems   Constitutional:  Negative for chills and fever.   HENT:  Negative for congestion.    Respiratory:  Negative for shortness of breath.    Cardiovascular:  Negative for chest pain and leg swelling.   Gastrointestinal:  Negative for constipation, diarrhea, nausea and vomiting.   Musculoskeletal: Negative.         Foot pain     Skin:  Negative for wound.   Neurological:  Negative for numbness.       OBJECTIVE     Vitals:    11/05/24 1414   BP: 130/72   Pulse: 78   SpO2: 98%         PHYSICAL EXAM  GEN:   Accompanied by none.     Foot/Ankle Exam    GENERAL  Appearance:  appears stated age  Orientation:  AAOx3  Affect:  appropriate  Gait:  unimpaired  Assistance:  independent  Right shoe gear: casual shoe  Left shoe gear: casual shoe    VASCULAR     Right Foot Vascularity   Normal vascular exam    Dorsalis pedis:  2+  Posterior tibial:  2+  Skin temperature:  warm  Edema grading:  None  CFT:  < 3 seconds  Pedal hair growth:  Present  Varicosities:  none     Left Foot Vascularity   Normal vascular exam    Dorsalis pedis:  2+  Posterior tibial:  2+  Skin temperature:  warm  Edema grading:  None  CFT:  < 3 seconds  Pedal hair growth:  Present  Varicosities:  none     NEUROLOGIC     Right Foot Neurologic   Light touch sensation: normal  Vibratory sensation: normal  Hot/Cold sensation: normal  Protective Sensation using [place]-David  Monofilament:   Sites intact: 10  Sites tested: 10     Left Foot Neurologic   Light touch sensation: normal  Vibratory sensation: normal  Hot/Cold sensation:  normal  Protective Sensation using [place]-David Monofilament:   Sites intact: 10  Sites tested: 10    MUSCULOSKELETAL     Right Foot Musculoskeletal   Ecchymosis:  none  Tenderness:  plantar arch tenderness and plantar fascia tenderness    Arch:  Normal     Left Foot Musculoskeletal   Ecchymosis:  none  Tenderness:  none  Arch:  Normal    MUSCLE STRENGTH     Right Foot Muscle Strength   Foot dorsiflexion:  5  Foot plantar flexion:  5  Foot inversion:  5  Foot eversion:  5     Left Foot Muscle Strength   Foot dorsiflexion:  5  Foot plantar flexion:  5  Foot inversion:  5  Foot eversion:  5    RANGE OF MOTION     Right Foot Range of Motion   Ankle dorsiflexion: 5 decreased     DERMATOLOGIC      Right Foot Dermatologic   Skin  Right foot skin is intact.      Left Foot Dermatologic   Skin  Left foot skin is intact.       RADIOLOGY/NUCLEAR:  No results found.    LABORATORY/CULTURE RESULTS:      PATHOLOGY RESULTS:       ASSESSMENT/PLAN     Diagnoses and all orders for this visit:    1. Plantar fasciitis, right (Primary)  -     Injection Tendon or Ligament  -     lidocaine (XYLOCAINE) 2% injection 1.5 mL  -     triamcinolone acetonide (KENALOG-40) injection 20 mg  -     Discontinue: dexAMETHasone (DECADRON) injection 10 mg  -     dexAMETHasone (DECADRON) injection 10 mg    2. Calcaneal spur of right foot  -     lidocaine (XYLOCAINE) 2% injection 1.5 mL  -     triamcinolone acetonide (KENALOG-40) injection 20 mg  -     Discontinue: dexAMETHasone (DECADRON) injection 10 mg  -     dexAMETHasone (DECADRON) injection 10 mg    3. Pain of right heel  -     lidocaine (XYLOCAINE) 2% injection 1.5 mL  -     triamcinolone acetonide (KENALOG-40) injection 20 mg  -     Discontinue: dexAMETHasone (DECADRON) injection 10 mg  -     dexAMETHasone (DECADRON) injection 10  mg              Comprehensive lower extremity examination and evaluation was performed.  Discussed findings and treatment plan including risks, benefits, and treatment options with patient in detail. Patient agreed with treatment plan.  After written consent obtained, plantar fascial injection performed as documented in procedure note. Post-procedure instructions given.  Conservative therapy including daily stretching (demonstrated proper way of performing), icing 3x daily, decreased activity, and nsaids PRN.   Continue supportive shoe gear and arch supports.  An After Visit Summary was printed and given to the patient at discharge, including (if requested) any available informative/educational handouts regarding diagnosis, treatment, or medications. All questions were answered to patient/family satisfaction. Should symptoms fail to improve or worsen they agree to call or return to clinic or to go to the Emergency Department. Discussed the importance of following up with any needed screening tests/labs/specialist appointments and any requested follow-up recommended by me today. Importance of maintaining follow-up discussed and patient accepts that missed appointments can delay diagnosis and potentially lead to worsening of conditions.  Return in about 6 weeks (around 12/17/2024) for Recheck, Follow-up with Dr. Rosales., or sooner if acute issues arise.      Injection Tendon or Ligament    Date/Time: 11/5/2024 2:47 PM    Performed by: Yoshi Rosales DPM  Authorized by: Yoshi Rosales DPM  Preparation: Patient was prepped and draped in the usual sterile fashion.  Local anesthesia used: yes  Anesthesia: local infiltration    Anesthesia:  Local anesthesia used: yes  Local Anesthetic: lidocaine 2% without epinephrine  Anesthetic total: 1.5 mL    Sedation:  Patient sedated: no    Patient tolerance: patient tolerated the procedure well with no immediate complications  Comments: 1cc Dexamethasone, 0.5cc Kenalog 40 -  right heel. No waste           This document has been electronically signed by Yoshi Rosales DPM on November 5, 2024 18:00 CST

## 2024-11-05 ENCOUNTER — OFFICE VISIT (OUTPATIENT)
Age: 64
End: 2024-11-05
Payer: COMMERCIAL

## 2024-11-05 VITALS
SYSTOLIC BLOOD PRESSURE: 130 MMHG | BODY MASS INDEX: 32.99 KG/M2 | HEART RATE: 78 BPM | WEIGHT: 198 LBS | OXYGEN SATURATION: 98 % | DIASTOLIC BLOOD PRESSURE: 72 MMHG | HEIGHT: 65 IN

## 2024-11-05 DIAGNOSIS — M79.671 PAIN OF RIGHT HEEL: ICD-10-CM

## 2024-11-05 DIAGNOSIS — M77.31 CALCANEAL SPUR OF RIGHT FOOT: ICD-10-CM

## 2024-11-05 DIAGNOSIS — M72.2 PLANTAR FASCIITIS, RIGHT: Primary | ICD-10-CM

## 2024-11-05 PROCEDURE — 20550 NJX 1 TENDON SHEATH/LIGAMENT: CPT | Performed by: PODIATRIST

## 2024-11-05 RX ORDER — TRIAMCINOLONE ACETONIDE 40 MG/ML
20 INJECTION, SUSPENSION INTRA-ARTICULAR; INTRAMUSCULAR ONCE
Status: COMPLETED | OUTPATIENT
Start: 2024-11-05 | End: 2024-11-05

## 2024-11-05 RX ORDER — DEXAMETHASONE SODIUM PHOSPHATE 10 MG/ML
10 INJECTION INTRAMUSCULAR; INTRAVENOUS ONCE
Status: COMPLETED | OUTPATIENT
Start: 2024-11-05 | End: 2024-11-05

## 2024-11-05 RX ORDER — LIDOCAINE HYDROCHLORIDE 20 MG/ML
1.5 INJECTION, SOLUTION INFILTRATION; PERINEURAL ONCE
Status: COMPLETED | OUTPATIENT
Start: 2024-11-05 | End: 2024-11-05

## 2024-11-05 RX ORDER — DEXAMETHASONE SODIUM PHOSPHATE 10 MG/ML
10 INJECTION INTRAMUSCULAR; INTRAVENOUS ONCE
Status: DISCONTINUED | OUTPATIENT
Start: 2024-11-05 | End: 2024-11-05

## 2024-11-05 RX ADMIN — DEXAMETHASONE SODIUM PHOSPHATE 10 MG: 10 INJECTION INTRAMUSCULAR; INTRAVENOUS at 14:46

## 2024-11-05 RX ADMIN — LIDOCAINE HYDROCHLORIDE 1.5 ML: 20 INJECTION, SOLUTION INFILTRATION; PERINEURAL at 14:45

## 2024-11-05 RX ADMIN — TRIAMCINOLONE ACETONIDE 20 MG: 40 INJECTION, SUSPENSION INTRA-ARTICULAR; INTRAMUSCULAR at 14:44

## 2024-11-19 DIAGNOSIS — G25.0 ESSENTIAL TREMOR: ICD-10-CM

## 2024-11-19 RX ORDER — PRIMIDONE 50 MG/1
150 TABLET ORAL NIGHTLY
Qty: 270 TABLET | Refills: 0 | Status: SHIPPED | OUTPATIENT
Start: 2024-11-19

## 2024-11-19 NOTE — TELEPHONE ENCOUNTER
"Caller: VARUN Meyers \"VERONIQUE\"    Relationship: Self    Best call back number: [phone]    Requested Prescriptions:   Requested Prescriptions     Pending Prescriptions Disp Refills    primidone (MYSOLINE) 50 MG tablet 180 tablet 1     Sig: Take 2 tablets by mouth Every Night.        Pharmacy where request should be sent: [facility]/PHARMACY #[id number] - [place], [place] - [address] AT ACROSS FROM [facility] [phone] [facility] [phone]      Last office visit with prescribing clinician: 1/22/2024   Last telemedicine visit with prescribing clinician: Visit date not found   Next office visit with prescribing clinician: 1/21/2025     Additional details provided by patient: PATIENT STATES THAT SHE DOES TAKE 3 PER DAY NOW, NOT 2. PATIENT IS COMPLETELY OUT AND HAS BEEN FOR A FEW DAYS.    Does the patient have less than a 3 day supply:  [x] Yes  [] No    Would you like a call back once the refill request has been completed: [] Yes [x] No    If the office needs to give you a call back, can they leave a voicemail: [] Yes [x] No    Bud Infante Rep   11/19/24 09:05 CST       "

## 2024-11-22 DIAGNOSIS — F32.A DEPRESSIVE DISORDER: ICD-10-CM

## 2024-11-22 RX ORDER — SERTRALINE HYDROCHLORIDE 25 MG/1
25 TABLET, FILM COATED ORAL DAILY
Qty: 90 TABLET | Refills: 0 | OUTPATIENT
Start: 2024-11-22

## 2024-11-22 NOTE — TELEPHONE ENCOUNTER
"  Caller: Luigi VARUN CARRASCO \"VERONIQUE\"    Relationship: Self    Best call back number: [phone]     Requested Prescriptions:   Requested Prescriptions     Pending Prescriptions Disp Refills    sertraline (ZOLOFT) 50 MG tablet 90 tablet 0     Sig: Take 1 tablet by mouth Daily. Along with the 25 mg sertraline pill for mood.  Take with food.    sertraline (ZOLOFT) 25 MG tablet 90 tablet 0     Sig: Take 1 tablet by mouth Daily. Along with the 50 mg sertraline for mood.  Take with food.        Pharmacy where request should be sent: [facility]/PHARMACY #[id number] - [place], [place] - [address] AT [address]CROSS FROM [address]  [phone] [facility] [phone]      Last office visit with prescribing clinician: 4/29/2024   Last telemedicine visit with prescribing clinician: Visit date not found   Next office visit with prescribing clinician: Visit date not found     Additional details provided by patient:     Does the patient have less than a 3 day supply:  [x] Yes  [] No    Would you like a call back once the refill request has been completed: [] Yes [] No    If the office needs to give you a call back, can they leave a voicemail: [] Yes [] No    Bud Gregory Rep   11/22/24 15:40 CST     "

## 2024-12-12 NOTE — PROGRESS NOTES
[facility] - PODIATRY    Today's Date: 2024     Patient Name: VARUN Meyers  MRN: [id number]  CSN: [id number]  PCP: Solomon Edward DO  Referring Provider: No ref. provider found    SUBJECTIVE     Chief Complaint   Patient presents with    Follow-up     Solomon Edward DO-2024-Return in about 6 weeks-pt states she is here today for plantar fasciitis/injection did help-pt denies pain today      HPI: VARUN Meyers, a 64 y.o.female, comes to clinic as a(n) established patient complaining of foot pain. Patient has h/o Depression, diverticulosis, plantar fasciitis, sleep apnea, tremors .  Patient is non-diabetic. Notes significant improvement with injection at her last visit. Has been using inserts in her shoes.  Denies stretching. Denies pain. Relates previous treatment(s) including steroids, stretching . Denies any constitutional symptoms. No other pedal complaints at this time.    Past Medical History:   Diagnosis Date    Colon polyp 5 Years ago    Depression     Diverticulosis 5 years ago    Family history of colonic polyps     History of colon polyps     Low back pain     Obesity     Seasonal allergies     Sleep apnea     Never officially tested    Tremor      Past Surgical History:   Procedure Laterality Date     SECTION      X 2    COLONOSCOPY  5 years ago    I have one scheduled for Fri, 3/18    COLONOSCOPY N/A 2022    Procedure: COLONOSCOPY WITH ANESTHESIA;  Surgeon: Keysha Disla MD;  Location: [facility] ENDOSCOPY;  Service: Gastroenterology;  Laterality: N/A;  pre screen  post diverticulosis,polyp  Dr. Garcia    DENTAL PROCEDURE      implants x 2    MOLE REMOVAL      From nose     Family History   Problem Relation Age of Onset    Diabetes type II Father     Heart disease Father     Stroke Father              COPD Father     Diabetes Father     Hearing loss Father         Hearing issues on my father's side of the family    Breast cancer  Mother     Migraines Mother     Cancer Mother     Melanoma Sister     Hearing loss Sister         tumor    Multiple sclerosis Sister     Thyroid disease Maternal Grandmother     Dementia Maternal Grandmother     Colon polyps Maternal Uncle         Unknown age    Alcohol abuse Maternal Uncle     Drug abuse Maternal Uncle     Colon cancer Neg Hx     Esophageal cancer Neg Hx     Liver disease Neg Hx     Ulcerative colitis Neg Hx     Rectal cancer Neg Hx     Stomach cancer Neg Hx     Ovarian cancer Neg Hx     Uterine cancer Neg Hx      Social History     Socioeconomic History    Marital status:    Tobacco Use    Smoking status: Former     Current packs/day: 0.00     Average packs/day: 0.5 packs/day for 22.3 years (11.2 ttl pk-yrs)     Types: Cigarettes     Start date: 1977     Quit date: 1999     Years since quittin.0     Passive exposure: Past    Smokeless tobacco: Never    Tobacco comments:        Vaping Use    Vaping status: Never Used   Substance and Sexual Activity    Alcohol use: Yes     Comment: Drink every evening, amounts and type vary.    Drug use: Never    Sexual activity: Not Currently     Partners: Male     Birth control/protection: Post-menopausal     Allergies   Allergen Reactions    Morphine Itching and Swelling           Topamax [Topiramate] Confusion     Brain fog, increased somnolence    Flonase [Fluticasone] Other (See Comments)     Blisters around nose     Current Outpatient Medications   Medication Sig Dispense Refill    betamethasone dipropionate (DIPROSONE) 0.05 % cream APPLY TO AFFECTED AREA ON HANDS TWICE A DAY FOR 2 WEEKS      cetirizine (zyrTEC) 10 MG tablet Take 1 tablet by mouth Daily.      ibuprofen (ADVIL,MOTRIN) 200 MG tablet Take 1 tablet by mouth Every 6 (Six) Hours As Needed.      primidone (MYSOLINE) 50 MG tablet Take 3 tablets by mouth Every Night. 270 tablet 0    sertraline (ZOLOFT) 25 MG tablet TAKE 1 TABLET BY MOUTH DAILY. ALONG WITH THE 50 MG  SERTRALINE FOR MOOD. TAKE WITH FOOD. 90 tablet 0    sertraline (ZOLOFT) 50 MG tablet TAKE 1 TABLET BY MOUTH DAILY. ALONG WITH THE 25 MG SERTRALINE PILL FOR MOOD. TAKE WITH FOOD. 90 tablet 0    vitamin D (ERGOCALCIFEROL) 1.25 MG (42709 UT) capsule capsule Take 1 capsule by mouth 1 (One) Time Per Week. 12 capsule 3     No current facility-administered medications for this visit.     Review of Systems   Constitutional:  Negative for chills and fever.   HENT:  Negative for congestion.    Respiratory:  Negative for shortness of breath.    Cardiovascular:  Negative for chest pain and leg swelling.   Gastrointestinal:  Negative for constipation, diarrhea, nausea and vomiting.   Musculoskeletal: Negative.         Foot pain     Skin:  Negative for wound.   Neurological:  Negative for numbness.       OBJECTIVE     Vitals:    12/17/24 1039   BP: 140/88   Pulse: 94   SpO2: 96%       PHYSICAL EXAM  GEN:   Accompanied by none.     Foot/Ankle Exam    GENERAL  Appearance:  appears stated age  Orientation:  AAOx3  Affect:  appropriate  Gait:  unimpaired  Assistance:  independent  Right shoe gear: casual shoe  Left shoe gear: casual shoe    VASCULAR     Right Foot Vascularity   Normal vascular exam    Dorsalis pedis:  2+  Posterior tibial:  2+  Skin temperature:  warm  Edema grading:  None  CFT:  < 3 seconds  Pedal hair growth:  Present  Varicosities:  none     Left Foot Vascularity   Normal vascular exam    Dorsalis pedis:  2+  Posterior tibial:  2+  Skin temperature:  warm  Edema grading:  None  CFT:  < 3 seconds  Pedal hair growth:  Present  Varicosities:  none     NEUROLOGIC     Right Foot Neurologic   Light touch sensation: normal  Vibratory sensation: normal  Hot/Cold sensation: normal  Protective Sensation using [place] Monofilament:   Sites intact: 10  Sites tested: 10     Left Foot Neurologic   Light touch sensation: normal  Vibratory sensation: normal  Hot/Cold sensation:  normal  Protective Sensation using  [place] Monofilament:   Sites intact: 10  Sites tested: 10    MUSCULOSKELETAL     Right Foot Musculoskeletal   Ecchymosis:  none  Tenderness:  none    Arch:  Normal     Left Foot Musculoskeletal   Ecchymosis:  none  Tenderness:  none  Arch:  Normal    MUSCLE STRENGTH     Right Foot Muscle Strength   Foot dorsiflexion:  5  Foot plantar flexion:  5  Foot inversion:  5  Foot eversion:  5     Left Foot Muscle Strength   Foot dorsiflexion:  5  Foot plantar flexion:  5  Foot inversion:  5  Foot eversion:  5    RANGE OF MOTION     Right Foot Range of Motion   Ankle dorsiflexion: 5 decreased     DERMATOLOGIC      Right Foot Dermatologic   Skin  Right foot skin is intact.      Left Foot Dermatologic   Skin  Left foot skin is intact.       RADIOLOGY/NUCLEAR:  No results found.    LABORATORY/CULTURE RESULTS:      PATHOLOGY RESULTS:       ASSESSMENT/PLAN     Diagnoses and all orders for this visit:    1. Plantar fasciitis, right (Primary)    2. Calcaneal spur of right foot    3. Pain of right heel                Comprehensive lower extremity examination and evaluation was performed.  Discussed findings and treatment plan including risks, benefits, and treatment options with patient in detail. Patient agreed with treatment plan.  Conservative therapy including daily stretching (demonstrated proper way of performing), icing 3x daily, decreased activity, and nsaids PRN.   Continue supportive shoe gear and arch supports.  Defer additional injection today.  An After Visit Summary was printed and given to the patient at discharge, including (if requested) any available informative/educational handouts regarding diagnosis, treatment, or medications. All questions were answered to patient/family satisfaction. Should symptoms fail to improve or worsen they agree to call or return to clinic or to go to the Emergency Department. Discussed the importance of following up with any needed screening tests/labs/specialist appointments  and any requested follow-up recommended by me today. Importance of maintaining follow-up discussed and patient accepts that missed appointments can delay diagnosis and potentially lead to worsening of conditions.  Return if symptoms worsen or fail to improve., or sooner if acute issues arise.      Procedures     This document has been electronically signed by Yoshi Rosales DPM on December 17, 2024 10:48 CST

## 2024-12-17 ENCOUNTER — OFFICE VISIT (OUTPATIENT)
Age: 64
End: 2024-12-17
Payer: COMMERCIAL

## 2024-12-17 VITALS
BODY MASS INDEX: 32.99 KG/M2 | HEIGHT: 65 IN | OXYGEN SATURATION: 96 % | HEART RATE: 94 BPM | DIASTOLIC BLOOD PRESSURE: 88 MMHG | WEIGHT: 198 LBS | SYSTOLIC BLOOD PRESSURE: 140 MMHG

## 2024-12-17 DIAGNOSIS — M72.2 PLANTAR FASCIITIS, RIGHT: Primary | ICD-10-CM

## 2024-12-17 DIAGNOSIS — M79.671 PAIN OF RIGHT HEEL: ICD-10-CM

## 2024-12-17 DIAGNOSIS — M77.31 CALCANEAL SPUR OF RIGHT FOOT: ICD-10-CM

## 2024-12-20 ENCOUNTER — OFFICE VISIT (OUTPATIENT)
Dept: OBSTETRICS AND GYNECOLOGY | Age: 64
End: 2024-12-20
Payer: COMMERCIAL

## 2024-12-20 VITALS
WEIGHT: 199 LBS | BODY MASS INDEX: 33.15 KG/M2 | HEIGHT: 65 IN | SYSTOLIC BLOOD PRESSURE: 130 MMHG | DIASTOLIC BLOOD PRESSURE: 88 MMHG

## 2024-12-20 DIAGNOSIS — Z12.31 SCREENING MAMMOGRAM, ENCOUNTER FOR: ICD-10-CM

## 2024-12-20 DIAGNOSIS — M85.80 OSTEOPENIA, UNSPECIFIED LOCATION: ICD-10-CM

## 2024-12-20 DIAGNOSIS — Z01.419 ENCOUNTER FOR GYNECOLOGICAL EXAMINATION WITHOUT ABNORMAL FINDING: Primary | ICD-10-CM

## 2024-12-20 PROCEDURE — G0123 SCREEN CERV/VAG THIN LAYER: HCPCS | Performed by: NURSE PRACTITIONER

## 2024-12-20 PROCEDURE — 87624 HPV HI-RISK TYP POOLED RSLT: CPT | Performed by: NURSE PRACTITIONER

## 2024-12-20 RX ORDER — NYSTATIN 100000 U/G
1 CREAM TOPICAL 2 TIMES DAILY
Qty: 30 G | Refills: 0 | Status: SHIPPED | OUTPATIENT
Start: 2024-12-20

## 2024-12-20 NOTE — PATIENT INSTRUCTIONS

## 2024-12-20 NOTE — PROGRESS NOTES
Chief Complaint  Annual Exam (Pt is here for an annual exam/Last pap 12/15/22 WNL /Mammogram 1/10/24 normal /Dexa 1/10/24 Osteoporosis /Pt has no complaints today )  History of Present Illness  The patient is a 64-year-old female who presents for evaluation of urinary urgency, labial irritation, and health maintenance.    She reports experiencing sudden urinary urgency but has not had any instances of urinary incontinence. She has increased her intake of decaffeinated coffee.    She has noticed a sensation of heat during urination over the past 2 weeks. She does not engage in any grooming practices such as waxing or trimming and questions if her recent thorough cleaning of the genital area could have led to this symptom. She has a history of recurrent yeast infections but has not experienced one recently.    She is uncertain about the current status of her mammogram. She declines the need for STD testing. Her annual laboratory tests are conducted by Dr. Edward, with the most recent set being completed approximately 6 months ago.     Subjective          VARUN Meyers presents to [facility]  History of Present Illness    Review of Systems   Constitutional:  Negative for activity change, appetite change, fatigue and fever.   HENT:  Negative for congestion, sore throat and trouble swallowing.    Eyes:  Negative for pain, discharge and visual disturbance.   Respiratory:  Negative for apnea, shortness of breath and wheezing.    Cardiovascular:  Negative for chest pain, palpitations and leg swelling.   Gastrointestinal:  Negative for abdominal pain, constipation and diarrhea.   Genitourinary:  Positive for urgency. Negative for frequency, pelvic pain and vaginal discharge.        Labial irritation     Musculoskeletal:  Negative for back pain and gait problem.   Skin:  Negative for color change and rash.   Neurological:  Negative for dizziness, weakness and numbness.   Psychiatric/Behavioral:   "Negative for confusion and sleep disturbance.         Objective   Vital Signs:   /88   Ht 165.1 cm (65\")   Wt 90.3 kg (199 lb)   BMI 33.12 kg/m²     Physical Exam  Vitals and nursing note reviewed. Exam conducted with a chaperone present.   Constitutional:       General: She is not in acute distress.     Appearance: She is well-developed. She is not diaphoretic.   HENT:      Head: Normocephalic.      Right Ear: External ear normal.      Left Ear: External ear normal.      Nose: Nose normal.   Eyes:      General: No scleral icterus.        Right eye: No discharge.         Left eye: No discharge.      Conjunctiva/sclera: Conjunctivae normal.      Pupils: Pupils are equal, round, and reactive to light.   Neck:      Thyroid: No thyromegaly.      Vascular: No carotid bruit.      Trachea: No tracheal deviation.   Cardiovascular:      Rate and Rhythm: Normal rate and regular rhythm.      Heart sounds: Normal heart sounds. No murmur heard.  Pulmonary:      Effort: Pulmonary effort is normal. No respiratory distress.      Breath sounds: Normal breath sounds. No wheezing.   Chest:   Breasts:     Breasts are symmetrical.      Right: Normal. No swelling, bleeding, inverted nipple, mass, nipple discharge, skin change or tenderness.      Left: Normal. No swelling, bleeding, inverted nipple, mass, nipple discharge, skin change or tenderness.   Abdominal:      General: There is no distension.      Palpations: Abdomen is soft. There is no mass.      Tenderness: There is no abdominal tenderness. There is no right CVA tenderness, left CVA tenderness or guarding.      Hernia: No hernia is present. There is no hernia in the left inguinal area or right inguinal area.   Genitourinary:     General: Normal vulva.      Exam position: Lithotomy position.      Labia:         Right: No tenderness.         Left: No tenderness.       Vagina: Normal. No signs of injury and foreign body. No vaginal discharge, erythema, tenderness or " bleeding.      Cervix: Normal.      Uterus: Normal. Not enlarged, not fixed and not tender.       Adnexa: Right adnexa normal and left adnexa normal.        Right: No mass, tenderness or fullness.          Left: No mass, tenderness or fullness.        Rectum: Normal. No mass.      Comments:   BSU normal  Urethral meatus  Normal      Inner labial irritation.     Musculoskeletal:         General: No tenderness. Normal range of motion.      Cervical back: Normal range of motion and neck supple.   Lymphadenopathy:      Head:      Right side of head: No submental, submandibular, tonsillar, preauricular, posterior auricular or occipital adenopathy.      Left side of head: No submental, submandibular, tonsillar, preauricular, posterior auricular or occipital adenopathy.      Cervical: No cervical adenopathy.      Right cervical: No superficial, deep or posterior cervical adenopathy.     Left cervical: No superficial, deep or posterior cervical adenopathy.      Upper Body:      Right upper body: No supraclavicular, axillary or pectoral adenopathy.      Left upper body: No supraclavicular, axillary or pectoral adenopathy.      Lower Body: No right inguinal adenopathy. No left inguinal adenopathy.   Skin:     General: Skin is warm and dry.      Findings: No bruising, erythema or rash.   Neurological:      Mental Status: She is alert and oriented to person, place, and time.      Coordination: Coordination normal.   Psychiatric:         Mood and Affect: Mood normal.         Behavior: Behavior normal.         Thought Content: Thought content normal.         Judgment: Judgment normal.       Result Review :   The following data was reviewed by: MARIA ALEJANDRA Mandujano on 12/20/2024:    Data reviewed : Radiologic studies mammogram    Current Outpatient Medications on File Prior to Visit   Medication Sig    betamethasone dipropionate (DIPROSONE) 0.05 % cream APPLY TO AFFECTED AREA ON HANDS TWICE A DAY FOR 2 WEEKS    cetirizine  (zyrTEC) 10 MG tablet Take 1 tablet by mouth Daily.    ibuprofen (ADVIL,MOTRIN) 200 MG tablet Take 1 tablet by mouth Every 6 (Six) Hours As Needed.    primidone (MYSOLINE) 50 MG tablet Take 3 tablets by mouth Every Night.    sertraline (ZOLOFT) 25 MG tablet TAKE 1 TABLET BY MOUTH DAILY. ALONG WITH THE 50 MG SERTRALINE FOR MOOD. TAKE WITH FOOD.    vitamin D (ERGOCALCIFEROL) 1.25 MG (23484 UT) capsule capsule Take 1 capsule by mouth 1 (One) Time Per Week.     No current facility-administered medications on file prior to visit.                Assessment and Plan      Well woman GYN exam.   Pap smear done per ASCCP guidelines.   Pelvic exam with chaperone present.     Will have lab work at PCP.     Colonoscopy is up to date    Bone density osteopenia 2023    Discussed STD prevention and testing.   Pt declines Chlamydia/Gonorrhea/Trichomonas, RPR, Hep panel and HIV testing.     Mammogram will be scheduled at [facility].         Assessment & Plan  1. Urinary urgency.  She reports an increased urgency to urinate, particularly after consuming decaffeinated coffee. It was explained that the acidity in coffee, tea, and sodas can irritate the bladder lining, causing spasms or irritability. She was advised to reduce the intake of these beverages, especially during travel, to manage symptoms. If this does not improve then pt will return for UA/culture.     2. Labial irritation.  She has irritated skin on the inside of the labia, likely due to an overgrowth of yeast in the warm, moist environment. The condition is not severe enough to cause concern, but treatment is recommended to improve comfort. A prescription for a topical antifungal cream was provided, to be applied twice daily for 7 days. This treatment aims to eliminate the excess yeast and promote tissue healing.      Diagnoses and all orders for this visit:    1. Encounter for gynecological examination without abnormal finding (Primary)  -     CBC & Differential  -      Comprehensive Metabolic Panel  -     Hemoglobin A1c  -     Lipid Panel With LDL / HDL Ratio  -     T4, Free  -     TSH  -     Liquid-based Pap Smear, Screening  -     HPV DNA Probe, Direct - ThinPrep Vial, Cervix    2. Screening mammogram, encounter for  -     Mammo Screening Digital Tomosynthesis Bilateral With CAD; Future    3. Osteopenia, unspecified location  -     Vitamin D,25-Hydroxy    Other orders  -     nystatin (MYCOSTATIN) 553735 UNIT/GM cream; Apply 1 Application topically to the appropriate area as directed 2 (Two) Times a Day.  Dispense: 30 g; Refill: 0                  Follow Up   Return for Annual physical.    Patient was given instructions and counseling regarding her condition or for health maintenance advice. Please see specific information pulled into the AVS if appropriate.     Patient or patient representative verbalized consent for the use of Ambient Listening during the visit with  MARIA ALEJANDRA Mandujano for chart documentation. 12/31/2024  20:36 CST

## 2024-12-21 LAB
25(OH)D3+25(OH)D2 SERPL-MCNC: 50.7 NG/ML (ref 30–100)
ALBUMIN SERPL-MCNC: 4.4 G/DL (ref 3.5–5.2)
ALBUMIN/GLOB SERPL: 2.3 G/DL
ALP SERPL-CCNC: 82 U/L (ref 39–117)
ALT SERPL-CCNC: 19 U/L (ref 1–33)
AST SERPL-CCNC: 21 U/L (ref 1–32)
BASOPHILS # BLD AUTO: 0.04 10*3/MM3 (ref 0–0.2)
BASOPHILS NFR BLD AUTO: 0.8 % (ref 0–1.5)
BILIRUB SERPL-MCNC: 0.6 MG/DL (ref 0–1.2)
BUN SERPL-MCNC: 10 MG/DL (ref 8–23)
BUN/CREAT SERPL: 11.5 (ref 7–25)
CALCIUM SERPL-MCNC: 9.2 MG/DL (ref 8.6–10.5)
CHLORIDE SERPL-SCNC: 101 MMOL/L (ref 98–107)
CHOLEST SERPL-MCNC: 179 MG/DL (ref 0–200)
CO2 SERPL-SCNC: 25.5 MMOL/L (ref 22–29)
CREAT SERPL-MCNC: 0.87 MG/DL (ref 0.57–1)
EGFRCR SERPLBLD CKD-EPI 2021: 74.5 ML/MIN/1.73
EOSINOPHIL # BLD AUTO: 0.15 10*3/MM3 (ref 0–0.4)
EOSINOPHIL NFR BLD AUTO: 3 % (ref 0.3–6.2)
ERYTHROCYTE [DISTWIDTH] IN BLOOD BY AUTOMATED COUNT: 12.3 % (ref 12.3–15.4)
GLOBULIN SER CALC-MCNC: 1.9 GM/DL
GLUCOSE SERPL-MCNC: 107 MG/DL (ref 65–99)
HBA1C MFR BLD: 5.4 % (ref 4.8–5.6)
HCT VFR BLD AUTO: 41.7 % (ref 34–46.6)
HDLC SERPL-MCNC: 63 MG/DL (ref 40–60)
HGB BLD-MCNC: 14.1 G/DL (ref 12–15.9)
IMM GRANULOCYTES # BLD AUTO: 0.01 10*3/MM3 (ref 0–0.05)
IMM GRANULOCYTES NFR BLD AUTO: 0.2 % (ref 0–0.5)
LDLC SERPL CALC-MCNC: 87 MG/DL (ref 0–100)
LDLC/HDLC SERPL: 1.3 {RATIO}
LYMPHOCYTES # BLD AUTO: 1.58 10*3/MM3 (ref 0.7–3.1)
LYMPHOCYTES NFR BLD AUTO: 32 % (ref 19.6–45.3)
MCH RBC QN AUTO: 32.5 PG (ref 26.6–33)
MCHC RBC AUTO-ENTMCNC: 33.8 G/DL (ref 31.5–35.7)
MCV RBC AUTO: 96.1 FL (ref 79–97)
MONOCYTES # BLD AUTO: 0.35 10*3/MM3 (ref 0.1–0.9)
MONOCYTES NFR BLD AUTO: 7.1 % (ref 5–12)
NEUTROPHILS # BLD AUTO: 2.8 10*3/MM3 (ref 1.7–7)
NEUTROPHILS NFR BLD AUTO: 56.9 % (ref 42.7–76)
PLATELET # BLD AUTO: 151 10*3/MM3 (ref 140–450)
POTASSIUM SERPL-SCNC: 4 MMOL/L (ref 3.5–5.2)
PROT SERPL-MCNC: 6.3 G/DL (ref 6–8.5)
RBC # BLD AUTO: 4.34 10*6/MM3 (ref 3.77–5.28)
SODIUM SERPL-SCNC: 141 MMOL/L (ref 136–145)
T4 FREE SERPL-MCNC: 1.34 NG/DL (ref 0.92–1.68)
TRIGL SERPL-MCNC: 172 MG/DL (ref 0–150)
TSH SERPL DL<=0.005 MIU/L-ACNC: 3.17 UIU/ML (ref 0.27–4.2)
VLDLC SERPL CALC-MCNC: 29 MG/DL (ref 5–40)
WBC # BLD AUTO: 4.93 10*3/MM3 (ref 3.4–10.8)

## 2024-12-23 DIAGNOSIS — F32.A DEPRESSIVE DISORDER: ICD-10-CM

## 2024-12-23 RX ORDER — SERTRALINE HYDROCHLORIDE 25 MG/1
25 TABLET, FILM COATED ORAL DAILY
Qty: 90 TABLET | Refills: 0 | Status: CANCELLED | OUTPATIENT
Start: 2024-12-23

## 2024-12-24 LAB
GEN CATEG CVX/VAG CYTO-IMP: NORMAL
HPV I/H RISK 4 DNA CVX QL PROBE+SIG AMP: NOT DETECTED
LAB AP CASE REPORT: NORMAL
LAB AP GYN ADDITIONAL INFORMATION: NORMAL
Lab: NORMAL
PATH INTERP SPEC-IMP: NORMAL
STAT OF ADQ CVX/VAG CYTO-IMP: NORMAL

## 2025-01-03 ENCOUNTER — OFFICE VISIT (OUTPATIENT)
Dept: FAMILY MEDICINE CLINIC | Facility: CLINIC | Age: 65
End: 2025-01-03
Payer: COMMERCIAL

## 2025-01-03 VITALS
TEMPERATURE: 97.2 F | SYSTOLIC BLOOD PRESSURE: 120 MMHG | BODY MASS INDEX: 34.72 KG/M2 | OXYGEN SATURATION: 97 % | HEART RATE: 85 BPM | DIASTOLIC BLOOD PRESSURE: 90 MMHG | HEIGHT: 65 IN | WEIGHT: 208.4 LBS

## 2025-01-03 DIAGNOSIS — F32.A DEPRESSIVE DISORDER: ICD-10-CM

## 2025-01-03 DIAGNOSIS — M77.8 TENDINITIS OF RIGHT HAND: ICD-10-CM

## 2025-01-03 DIAGNOSIS — R42 VERTIGO: Primary | ICD-10-CM

## 2025-01-03 PROCEDURE — 99214 OFFICE O/P EST MOD 30 MIN: CPT | Performed by: FAMILY MEDICINE

## 2025-01-03 RX ORDER — SERTRALINE HYDROCHLORIDE 25 MG/1
25 TABLET, FILM COATED ORAL DAILY
Qty: 90 TABLET | Refills: 3 | Status: SHIPPED | OUTPATIENT
Start: 2025-01-03

## 2025-01-03 RX ORDER — PREDNISONE 20 MG/1
20 TABLET ORAL DAILY
Qty: 13 TABLET | Refills: 0 | Status: SHIPPED | OUTPATIENT
Start: 2025-01-03

## 2025-01-10 NOTE — PROGRESS NOTES
"Chief Complaint  Dizziness (Patient presents to clinic with c/o dizziness when she lays down and when standing from the seated position. Patient states she had a cold recently.) and Hand Pain (Patient c/o right hand pain with swelling. Patient denies any injury)    Subjective        VARUN Meyers presents to [facility]  Dizziness  Hand Pain       Intermittent dizziness with position changes worse after recent URI   Complains of R hand pain, no redness, worse with overuse, not worse in the AM.     Objective   Vital Signs:  /90 (BP Location: Right arm, Patient Position: Sitting, Cuff Size: Adult)   Pulse 85   Temp 97.2 °F (36.2 °C) (Temporal)   Ht 165.1 cm (65\")   Wt 94.5 kg (208 lb 6.4 oz)   SpO2 97%   BMI 34.68 kg/m²   Estimated body mass index is 34.68 kg/m² as calculated from the following:    Height as of this encounter: 165.1 cm (65\").    Weight as of this encounter: 94.5 kg (208 lb 6.4 oz).            Physical Exam  Vitals and nursing note reviewed.   Constitutional:       General: She is not in acute distress.     Appearance: She is not diaphoretic.   HENT:      Head: Normocephalic and atraumatic.      Nose: Nose normal.   Eyes:      General: No scleral icterus.        Right eye: No discharge.         Left eye: No discharge.      Conjunctiva/sclera: Conjunctivae normal.   Neck:      Trachea: No tracheal deviation.   Pulmonary:      Effort: Pulmonary effort is normal.   Musculoskeletal:      Comments: Pain with passive finger abduction of R hand   Skin:     General: Skin is warm and dry.      Coloration: Skin is not pale.   Neurological:      Mental Status: She is alert and oriented to person, place, and time.   Psychiatric:         Behavior: Behavior normal.         Thought Content: Thought content normal.         Judgment: Judgment normal.        Result Review :                Assessment and Plan   Diagnoses and all orders for this visit:    1. Vertigo " (Primary)  -     predniSONE (DELTASONE) 20 MG tablet; Take 1 tablet by mouth Daily. 20 mg daily x 7 days, then 10 mg daily till finished  Dispense: 13 tablet; Refill: 0    2. Depressive disorder  -     sertraline (ZOLOFT) 25 MG tablet; Take 1 tablet by mouth Daily. Along with the 50 mg sertraline for mood.  Take with food.  Dispense: 90 tablet; Refill: 3  -     sertraline (ZOLOFT) 50 MG tablet; Take 1 tablet by mouth Daily. Along with the 25 mg sertraline pill for mood.  Take with food.  Dispense: 90 tablet; Refill: 3    3. Tendinitis of right hand  -     predniSONE (DELTASONE) 20 MG tablet; Take 1 tablet by mouth Daily. 20 mg daily x 7 days, then 10 mg daily till finished  Dispense: 13 tablet; Refill: 0    Depression stable, refill sertraline  F/u PRN  Epley handout for vertigo

## 2025-01-15 ENCOUNTER — HOSPITAL ENCOUNTER (OUTPATIENT)
Dept: MAMMOGRAPHY | Facility: HOSPITAL | Age: 65
Discharge: HOME | End: 2025-01-15
Admitting: NURSE PRACTITIONER
Payer: COMMERCIAL

## 2025-01-15 DIAGNOSIS — Z12.31 SCREENING MAMMOGRAM, ENCOUNTER FOR: ICD-10-CM

## 2025-01-15 PROCEDURE — 77067 SCR MAMMO BI INCL CAD: CPT

## 2025-01-15 PROCEDURE — 77063 BREAST TOMOSYNTHESIS BI: CPT

## 2025-01-20 NOTE — PROGRESS NOTES
"Chief Complaint  Tremors    Subjective        VARUN Meyers presents to [facility]    History of Present Illness  64-year-old female here for follow-up.  Had been doing well until noticing some worsening in the last few months.               Current Outpatient Medications:     betamethasone dipropionate (DIPROSONE) 0.05 % cream, APPLY TO AFFECTED AREA ON HANDS TWICE A DAY FOR 2 WEEKS, Disp: , Rfl:     cetirizine (zyrTEC) 10 MG tablet, Take 1 tablet by mouth Daily., Disp: , Rfl:     ibuprofen (ADVIL,MOTRIN) 200 MG tablet, Take 1 tablet by mouth Every 6 (Six) Hours As Needed., Disp: , Rfl:     multivitamin (MULTI VITAMIN PO), Take 1 tablet by mouth Daily., Disp: , Rfl:     nystatin (MYCOSTATIN) 860572 UNIT/GM cream, Apply 1 Application topically to the appropriate area as directed 2 (Two) Times a Day., Disp: 30 g, Rfl: 0    predniSONE (DELTASONE) 20 MG tablet, Take 1 tablet by mouth Daily. 20 mg daily x 7 days, then 10 mg daily till finished, Disp: 13 tablet, Rfl: 0    primidone (MYSOLINE) 50 MG tablet, Take 4 tablets by mouth Every Night for 180 days., Disp: 360 tablet, Rfl: 1    sertraline (ZOLOFT) 25 MG tablet, Take 1 tablet by mouth Daily. Along with the 50 mg sertraline for mood.  Take with food., Disp: 90 tablet, Rfl: 3    sertraline (ZOLOFT) 50 MG tablet, Take 1 tablet by mouth Daily. Along with the 25 mg sertraline pill for mood.  Take with food., Disp: 90 tablet, Rfl: 3    vitamin D (ERGOCALCIFEROL) 1.25 MG (66007 UT) capsule capsule, Take 1 capsule by mouth 1 (One) Time Per Week., Disp: 12 capsule, Rfl: 3       Objective   Vital Signs:   /78   Pulse 67   Ht 165.1 cm (65\")   Wt 94.3 kg (208 lb)   SpO2 98%   BMI 34.61 kg/m²     Physical Exam  Constitutional:       General: She is awake.   Eyes:      Extraocular Movements: Extraocular movements intact.      Pupils: Pupils are equal, round, and reactive to light.   Neurological:      Mental Status: She is alert.      " Deep Tendon Reflexes: Reflexes are normal and symmetric.   Psychiatric:         Speech: Speech normal.        Minimal low amplitude postural tremor with arms outstretched    Result Review :                     Assessment and Plan   63-year-old female with essential tremor.  She has had a good response from primidone.  Increased at last visit due to her reporting some breakthrough symptoms.  This did help initially but more recently it has been worsening.  We discussed some options.  She previously had been on Topamax which she was unable to tolerate even at very low dose.  We had held off on trying propranolol in the past due to her history of depression.  We discussed this again today and her depression has been v follow-up 6 months.  collette stable so could try.  However she would first like to increase the primidone.  She is not having any side effects or increase sleepiness/grogginess.    1.  Trial of primidone 50 mg in the morning and 150 mg at night.  If she is unable to tolerate this can switch to 200 mg nightly.  2.  She will call with an update if this is unsuccessful.  Will likely add trial of propranolol.  3.  Follow-up 6 months.    Return in about 6 months (around 7/21/2025).  Patient was given instructions and counseling regarding her condition or for health maintenance advice. Please see specific information pulled into the AVS if appropriate.

## 2025-01-21 ENCOUNTER — OFFICE VISIT (OUTPATIENT)
Dept: NEUROLOGY | Facility: CLINIC | Age: 65
End: 2025-01-21
Payer: COMMERCIAL

## 2025-01-21 VITALS
SYSTOLIC BLOOD PRESSURE: 130 MMHG | DIASTOLIC BLOOD PRESSURE: 78 MMHG | HEIGHT: 65 IN | WEIGHT: 208 LBS | OXYGEN SATURATION: 98 % | BODY MASS INDEX: 34.66 KG/M2 | HEART RATE: 67 BPM

## 2025-01-21 DIAGNOSIS — G25.0 ESSENTIAL TREMOR: ICD-10-CM

## 2025-01-21 PROCEDURE — 99214 OFFICE O/P EST MOD 30 MIN: CPT | Performed by: PSYCHIATRY & NEUROLOGY

## 2025-01-21 RX ORDER — DIPHENOXYLATE HYDROCHLORIDE AND ATROPINE SULFATE 2.5; .025 MG/1; MG/1
1 TABLET ORAL DAILY
COMMUNITY

## 2025-01-21 RX ORDER — PRIMIDONE 50 MG/1
200 TABLET ORAL NIGHTLY
Qty: 360 TABLET | Refills: 1 | Status: SHIPPED | OUTPATIENT
Start: 2025-01-21 | End: 2025-07-20

## 2025-05-20 ENCOUNTER — OFFICE VISIT (OUTPATIENT)
Dept: FAMILY MEDICINE CLINIC | Facility: CLINIC | Age: 65
End: 2025-05-20
Payer: COMMERCIAL

## 2025-05-20 VITALS
WEIGHT: 204 LBS | HEART RATE: 73 BPM | SYSTOLIC BLOOD PRESSURE: 144 MMHG | OXYGEN SATURATION: 97 % | BODY MASS INDEX: 33.99 KG/M2 | HEIGHT: 65 IN | DIASTOLIC BLOOD PRESSURE: 82 MMHG | TEMPERATURE: 97.5 F
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
## 2025-05-20 DIAGNOSIS — L03.221 CELLULITIS OF NECK: ICD-10-CM

## 2025-05-20 DIAGNOSIS — S10.96XA TICK BITE OF NECK, INITIAL ENCOUNTER: Primary | ICD-10-CM

## 2025-05-20 RX ORDER — DOXYCYCLINE 100 MG/1
100 CAPSULE ORAL 2 TIMES DAILY
Qty: 14 CAPSULE | Refills: 0 | Status: SHIPPED | OUTPATIENT
Start: 2025-05-20 | End: 2025-05-27

## 2025-05-20 NOTE — PROGRESS NOTES
MARIA ALEJANDRA Avendaño  [facility]   Family Medicine  [address]  [place] [address]  Phone: [phone]  Fax: [phone]         Chief Complaint:  Chief Complaint   Patient presents with    Insect Bite     Tick bite        History:  VARUN Meyers is a 64 y.o. female that is an established patient. She  is here for evaluation of the above complaint.    Insect Bite  Pertinent negative symptoms include no fever.      History of Present Illness  The patient presents for evaluation of a tick bite.    She reported an incident on Sunday while watching television, during which she experienced an itch behind her right ear, and subsequently discovered a tick. The tick was still alive and moving. She removed the tick with her finger and disposed of it in the toilet. The tick was small with black dots on its back. Initially, there were no noticeable changes, but by the following evening, she developed itching in her ear, which is pierced. She suspected an infection due to swelling and hardness in the area, along with a knot-like formation. She thinks the tick was encountered earlier that day.    She has not experienced any hearing issues. She had been at her daughter's house on Sunday, sitting in the backyard, and also has a creek with vegetation behind her house where she walks her dog. She is unsure of the exact location where she might have been bitten by the tick. She described the sensation as tight rather than painful. She has no known allergies to antibiotics. She has no history of reactions to tick bites[place] She has not experienced any fever.    She is uncertain about her tetanus status and believes it may be due. She has not had any gastrointestinal issues with antibiotics in the past. She has not taken her allergy medication for a few days but resumed it this morning. She has been cautious not to sleep on the affected side to avoid exerting pressure  "on it.      Results         ROS:  Review of Systems   Constitutional: Negative.  Negative for fever.   HENT: Negative.     Respiratory: Negative.     Skin:  Positive for wound.         reports that she quit smoking about 25 years ago. Her smoking use included cigarettes. She started smoking about 47 years ago. She has a 11.2 pack-year smoking history. She has been exposed to tobacco smoke. She has never used smokeless tobacco. She reports current alcohol use. She reports that she does not use drugs.    Current Outpatient Medications   Medication Instructions    betamethasone dipropionate (DIPROSONE) 0.05 % cream APPLY TO AFFECTED AREA ON HANDS TWICE A DAY FOR 2 WEEKS    cetirizine (ZYRTEC) 10 mg, Daily    doxycycline (VIBRAMYCIN) 100 mg, Oral, 2 Times Daily    ibuprofen (ADVIL,MOTRIN) 200 mg, Every 6 Hours PRN    multivitamin (MULTI VITAMIN PO) 1 tablet, Daily    primidone (MYSOLINE) 200 mg, Oral, Nightly    sertraline (ZOLOFT) 25 mg, Oral, Daily, Along with the 50 mg sertraline for mood.  Take with food.    sertraline (ZOLOFT) 50 mg, Oral, Daily, Along with the 25 mg sertraline pill for mood.  Take with food.    vitamin D (ERGOCALCIFEROL) 50,000 Units, Oral, Weekly       OBJECTIVE:  /82   Pulse 73   Temp 97.5 °F (36.4 °C)   Ht 165.1 cm (65\")   Wt 92.5 kg (204 lb)   SpO2 97%   BMI 33.95 kg/m²    Physical Exam  Vitals and nursing note reviewed.   Constitutional:       Appearance: Normal appearance.   HENT:      Head: Normocephalic and atraumatic.        Comments: Inflammation, no erythema     Nose: Nose normal.   Eyes:      Conjunctiva/sclera: Conjunctivae normal.   Cardiovascular:      Rate and Rhythm: Normal rate and regular rhythm.   Pulmonary:      Effort: Pulmonary effort is normal. No respiratory distress.      Breath sounds: Normal breath sounds. No wheezing or rales.   Neurological:      General: No focal deficit present.      Mental Status: She is alert and oriented to person, place, and time. "   Psychiatric:         Mood and Affect: Mood normal.         Behavior: Behavior normal.       Physical Exam  Ears: External ear canals and tympanic membranes intact    Procedures    Assessment/Plan:     Diagnoses and all orders for this visit:    1. Tick bite of neck, initial encounter (Primary)  -     Tdap Vaccine => 8yo IM (BOOSTRIX/ADACEL)  -     doxycycline (VIBRAMYCIN) 100 MG capsule; Take 1 capsule by mouth 2 (Two) Times a Day for 7 days.  Dispense: 14 capsule; Refill: 0    2. Cellulitis of neck  -     doxycycline (VIBRAMYCIN) 100 MG capsule; Take 1 capsule by mouth 2 (Two) Times a Day for 7 days.  Dispense: 14 capsule; Refill: 0           Assessment & Plan  1. Tick bite.  - The patient noticed a tick on 05/18/2025, which she removed and disposed of. She has since developed swelling and hardness in the area, with a knot-like formation.  - Physical examination revealed no fever, and both ears appeared clear with no fluid or wax.  - Discussed the condition, including the potential for cellulitis, and the importance of completing the full course of antibiotics. Reviewed the patient's tetanus immunization status and determined an update is necessary.  - Prescribed doxycycline 100 mg, to be taken twice daily for 7 days. A tetanus vaccine will be administered today. The patient is advised to monitor for symptoms such as chills, fever, or increased redness, and to contact the office if these occur. If symptoms worsen or if a fever develops, intravenous antibiotics will be considered through the ER.    An After Visit Summary was printed and given to the patient at discharge.  No follow-ups on file.       There are no Patient Instructions on file for this visit.      Discussion:     I spent 25 minutes caring for VRAUN on this date of service. This time includes time spent by me in the following activities: preparing for the visit, reviewing tests, performing a medically appropriate examination and/or evaluation,  counseling and educating the patient/family/caregiver, documenting information in the medical record, independently interpreting results and communicating that information with the patient/family/caregiver, care coordination, ordering medications, obtaining a separately obtained history, and reviewing a separately obtained history   Patient or patient representative verbalized consent for the use of Ambient Listening during the visit with  MARIA ALEJANDRA Avendaño for chart documentation. 5/20/2025  12:33 CDT    Rosa BESS 5/20/2025   Electronically signed.

## 2025-05-30 ENCOUNTER — PROCEDURE VISIT (OUTPATIENT)
Dept: FAMILY MEDICINE CLINIC | Facility: CLINIC | Age: 65
End: 2025-05-30
Payer: COMMERCIAL

## 2025-05-30 VITALS
HEART RATE: 84 BPM | HEIGHT: 65 IN | TEMPERATURE: 97.8 F | SYSTOLIC BLOOD PRESSURE: 130 MMHG | DIASTOLIC BLOOD PRESSURE: 80 MMHG | OXYGEN SATURATION: 94 % | WEIGHT: 207 LBS | BODY MASS INDEX: 34.49 KG/M2

## 2025-05-30 DIAGNOSIS — M54.50 MIDLINE LOW BACK PAIN WITHOUT SCIATICA, UNSPECIFIED CHRONICITY: Primary | ICD-10-CM

## 2025-05-30 DIAGNOSIS — M99.02 SOMATIC DYSFUNCTION OF SPINE, THORACIC: ICD-10-CM

## 2025-05-30 DIAGNOSIS — M99.05 SOMATIC DYSFUNCTION OF PELVIC REGION: ICD-10-CM

## 2025-05-30 DIAGNOSIS — M99.03 SOMATIC DYSFUNCTION OF SPINE, LUMBAR: ICD-10-CM

## 2025-05-30 DIAGNOSIS — M99.06 SOMATIC DYSFUNCTION OF LOWER EXTREMITY: ICD-10-CM

## 2025-05-30 DIAGNOSIS — M99.04 SOMATIC DYSFUNCTION OF SPINE, SACRAL: ICD-10-CM

## 2025-06-12 ENCOUNTER — OFFICE VISIT (OUTPATIENT)
Dept: FAMILY MEDICINE CLINIC | Facility: CLINIC | Age: 65
End: 2025-06-12
Payer: MEDICARE

## 2025-06-12 VITALS
SYSTOLIC BLOOD PRESSURE: 120 MMHG | DIASTOLIC BLOOD PRESSURE: 90 MMHG | OXYGEN SATURATION: 98 % | WEIGHT: 198.8 LBS | BODY MASS INDEX: 33.12 KG/M2 | HEIGHT: 65 IN | HEART RATE: 80 BPM | TEMPERATURE: 96.8 F

## 2025-06-12 DIAGNOSIS — M99.06 SOMATIC DYSFUNCTION OF LOWER EXTREMITY: ICD-10-CM

## 2025-06-12 DIAGNOSIS — M99.03 SOMATIC DYSFUNCTION OF SPINE, LUMBAR: ICD-10-CM

## 2025-06-12 DIAGNOSIS — M99.05 SOMATIC DYSFUNCTION OF PELVIC REGION: ICD-10-CM

## 2025-06-12 DIAGNOSIS — M99.04 SOMATIC DYSFUNCTION OF SPINE, SACRAL: ICD-10-CM

## 2025-06-12 DIAGNOSIS — M25.551 RIGHT HIP PAIN: Primary | ICD-10-CM

## 2025-06-12 RX ORDER — METHYLPREDNISOLONE 4 MG/1
TABLET ORAL
Qty: 21 TABLET | Refills: 0 | Status: SHIPPED | OUTPATIENT
Start: 2025-06-12

## 2025-06-12 NOTE — PROGRESS NOTES
"Chief Complaint  Back Pain (Patient presents to clinic for treatment of LBP)    Subjective        VARUN Meyers presents to [facility]  Back Pain    Moderate aching midline low back pain x 2 weeks worse after moving houses. No radicular symptoms. ROS otherwise neg    Objective   Vital Signs:  /80 (BP Location: Right arm, Patient Position: Sitting, Cuff Size: Adult)   Pulse 84   Temp 97.8 °F (36.6 °C) (Temporal)   Ht 165.1 cm (65\")   Wt 93.9 kg (207 lb)   SpO2 94%   BMI 34.45 kg/m²   Estimated body mass index is 34.45 kg/m² as calculated from the following:    Height as of this encounter: 165.1 cm (65\").    Weight as of this encounter: 93.9 kg (207 lb).        Physical Exam  Vitals and nursing note reviewed.   Constitutional:       General: She is not in acute distress.     Appearance: She is not diaphoretic.   HENT:      Head: Normocephalic and atraumatic.      Nose: Nose normal.   Eyes:      General: No scleral icterus.        Right eye: No discharge.         Left eye: No discharge.      Conjunctiva/sclera: Conjunctivae normal.   Neck:      Trachea: No tracheal deviation.   Pulmonary:      Effort: Pulmonary effort is normal.   Skin:     General: Skin is warm and dry.      Coloration: Skin is not pale.   Neurological:      Mental Status: She is alert and oriented to person, place, and time.   Psychiatric:         Behavior: Behavior normal.         Thought Content: Thought content normal.         Judgment: Judgment normal.      Osteopathic Structural Exam  Procedure Note for Osteopathic Manipulative Treatment    Pre-procedure diagnoses: Somatic dysfunctions as listed below.  Consent: Oral consent given for Osteopathic Treatment  Post-procedure diagnoses: same  Complications of procedure: none, patient tolerated procedure well    The evaluation including the history, physical exam and the management decisions, indicate than an appropriate intervention on this date of " service is osteopathic manipulative treatment. Oral permission for the osteopathic procedure was obtained. The following treatment methods and the responses for each body region are listed below.        Region Somatic Dysfunction Severity OMT technique Response      Thoracic  T12 FRSR Moderate  Balanced ligamentous tension  Improved       Lumbar L2 FRSR  L4 ERSR Moderate Balanced ligamentous tension Improved      Sacral L unilateral flexion Moderate Balanced ligamentous tension Improved   Pelvic L pelvic diaphragm rotation Moderate Balanced ligamentous tension Improved      Lower Extremities L > R psoas spasm  Moderate  Balanced ligamentous tension Improved        Result Review :                Assessment and Plan   Diagnoses and all orders for this visit:    1. Midline low back pain without sciatica, unspecified chronicity (Primary)    2. Somatic dysfunction of spine, thoracic    3. Somatic dysfunction of spine, lumbar    4. Somatic dysfunction of spine, sacral    5. Somatic dysfunction of pelvic region    6. Somatic dysfunction of lower extremity    OMT to balance autonomic tone, improve fascial symmetry and respiratory/circulatory/lymphatic compliance  OTC pain meds PRN  F/u PRN

## 2025-06-24 NOTE — PROGRESS NOTES
"Chief Complaint  Back Pain (Patient presents to clinic for a follow up on back pain) and Bloated (Patient reports she had a diverticulitis episode 13months ago )    Subjective        VARUN Meyers presents to [facility]  Back Pain    Still having right sided pelvis and hip pain it is radiating around to the back, she got minor improvement from last treatment but she still been having persistent aching pain    Objective   Vital Signs:  /90 (BP Location: Right arm, Patient Position: Sitting, Cuff Size: Adult)   Pulse 80   Temp 96.8 °F (36 °C) (Temporal)   Ht 165.1 cm (65\")   Wt 90.2 kg (198 lb 12.8 oz)   SpO2 98%   BMI 33.08 kg/m²   Estimated body mass index is 33.08 kg/m² as calculated from the following:    Height as of this encounter: 165.1 cm (65\").    Weight as of this encounter: 90.2 kg (198 lb 12.8 oz).      Physical Exam  Vitals and nursing note reviewed.   Constitutional:       General: She is not in acute distress.     Appearance: She is not diaphoretic.   HENT:      Head: Normocephalic and atraumatic.      Nose: Nose normal.   Eyes:      General: No scleral icterus.        Right eye: No discharge.         Left eye: No discharge.      Conjunctiva/sclera: Conjunctivae normal.   Neck:      Trachea: No tracheal deviation.   Pulmonary:      Effort: Pulmonary effort is normal.   Skin:     General: Skin is warm and dry.      Coloration: Skin is not pale.   Neurological:      Mental Status: She is alert and oriented to person, place, and time.   Psychiatric:         Behavior: Behavior normal.         Thought Content: Thought content normal.         Judgment: Judgment normal.      Osteopathic Structural Exam  Procedure Note for Osteopathic Manipulative Treatment    Pre-procedure diagnoses: Somatic dysfunctions as listed below.  Consent: Oral consent given for Osteopathic Treatment  Post-procedure diagnoses: same  Complications of procedure: none, patient tolerated " procedure well    The evaluation including the history, physical exam and the management decisions, indicate than an appropriate intervention on this date of service is osteopathic manipulative treatment. Oral permission for the osteopathic procedure was obtained. The following treatment methods and the responses for each body region are listed below.        Region Somatic Dysfunction Severity OMT technique Response      Lumbar L5 ERSL Moderate Balanced ligamentous tension Improved      Sacral L on L Moderate Balanced ligamentous tension Improved   Pelvic R anterior rotation Moderate Balanced ligamentous tension Improved      Lower Extremities  R hip acetabular compression  R femur internal rotation  Moderate  Balanced ligamentous tension Improved        Result Review :                Assessment and Plan   Diagnoses and all orders for this visit:    1. Right hip pain (Primary)  -     methylPREDNISolone (MEDROL) 4 MG dose pack; Take as directed on package instructions.  Dispense: 21 tablet; Refill: 0    2. Somatic dysfunction of spine, lumbar    3. Somatic dysfunction of spine, sacral    4. Somatic dysfunction of pelvic region    5. Somatic dysfunction of lower extremity    OMT to balance autonomic tone, improve fascial symmetry and respiratory/circulatory/lymphatic compliance  F/u PRN

## 2025-06-27 ENCOUNTER — TELEPHONE (OUTPATIENT)
Dept: WOUND CARE | Facility: HOSPITAL | Age: 65
End: 2025-06-27
Payer: MEDICARE

## 2025-06-27 NOTE — TELEPHONE ENCOUNTER
Caller: VARUN    Relationship to patient: SELF    Best call back number: [phone]    Type of visit: REQUESTING CORTIOSONE INJECTION FOR HER PLANTAR FASCIITIS RIGHT FOOT- FIRST AVAILABLE IS 7/29/25- SHE IS GOING TO [place] ON 7/19/25 FOR VACATION- NEEDS APPTS BEFORE THEN- PLEASE CALL

## 2025-06-27 NOTE — TELEPHONE ENCOUNTER
Called patient to see if she would like to see a nurse practitioner for the injection as Dr. Rosales is booked out until late August. Patient stated that she would be fine with an APRN giving her the injection. Scheduled patient per MARIA ALEJANDRA Ambrosio for 7/1/25 @ 1:30 PM. Patient voiced understanding.    DUDLEY Nobles

## 2025-06-30 NOTE — PROGRESS NOTES
[facility] - PODIATRY    Today's Date: 2025     Patient Name: VARUN Meyers  MRN: [id number]  CSN: [id number]  PCP: Solomon Edward DO  Referring Provider: No ref. provider found    SUBJECTIVE     Chief Complaint   Patient presents with    Follow-up     Solomon Edward DO 25  pt wants a plantar fascial injection   Pt here for follow-up. Pt would like to have a plantar fascial injection on her right foot.     HPI: VARUN Meyers, a 65 y.o.female, comes to clinic as a(n) established patient complaining of foot pain. Patient has h/o Depression, diverticulosis, plantar fasciitis, sleep apnea, tremors.  Patient is non-diabetic. Notes significant improvement with injection at her last visit.  Reports her pain is starting to return and she has a trip coming up where she will be walking many miles daily.  Has been using inserts in her shoes.  Denies pain. Relates previous treatment(s) including steroids, stretching, physical therapy. Denies any constitutional symptoms. No other pedal complaints at this time.    Past Medical History:   Diagnosis Date    Colon polyp 5 Years ago    Depression     Difficulty walking     Diverticulosis 5 years ago    Family history of colonic polyps     History of colon polyps     Low back pain     Multiple gestation Oct. 1988    Obesity     Plantar fasciitis     PMS (premenstrual syndrome)  [id number]    Seasonal allergies     Sleep apnea     Never officially tested    Tremor     Varicella      Past Surgical History:   Procedure Laterality Date     SECTION  1989 & 9.7.1995    X 2    COLONOSCOPY  5 years ago    I have one scheduled for Fri, 3/18    COLONOSCOPY N/A 2022    Procedure: COLONOSCOPY WITH ANESTHESIA;  Surgeon: Keysha Disla MD;  Location: [facility] ENDOSCOPY;  Service: Gastroenterology;  Laterality: N/A;  pre screen  post diverticulosis,polyp  Dr. Garcia    DENTAL PROCEDURE      implants x 2    MOLE REMOVAL       From nose     Family History   Problem Relation Age of Onset    Diabetes type II Father     Heart disease Father     Stroke Father              COPD Father     Diabetes Father     Hearing loss Father         Hearing issues on my father's side of the family    Coronary artery disease Father     Breast cancer Mother              Migraines Mother     Cancer Mother     Melanoma Sister         Small, removed, no reoccurance.    Hearing loss Sister         tumor    Multiple sclerosis Sister     Thyroid disease Maternal Grandmother     Dementia Maternal Grandmother     Colon polyps Maternal Uncle         Unknown age    Alcohol abuse Maternal Uncle     Drug abuse Maternal Uncle     Colon cancer Neg Hx     Esophageal cancer Neg Hx     Liver disease Neg Hx     Ulcerative colitis Neg Hx     Rectal cancer Neg Hx     Stomach cancer Neg Hx     Ovarian cancer Neg Hx     Uterine cancer Neg Hx      Social History     Socioeconomic History    Marital status:    Tobacco Use    Smoking status: Former     Current packs/day: 0.00     Average packs/day: 0.5 packs/day for 22.3 years (11.2 ttl pk-yrs)     Types: Cigarettes     Start date: 1977     Quit date: 1999     Years since quittin.6     Passive exposure: Past    Smokeless tobacco: Never    Tobacco comments:        Vaping Use    Vaping status: Never Used   Substance and Sexual Activity    Alcohol use: Yes     Comment: Drink every evening, amounts and type vary.    Drug use: Never    Sexual activity: Not Currently     Partners: Male     Birth control/protection: Post-menopausal     Allergies   Allergen Reactions    Morphine Itching and Swelling           Topamax [Topiramate] Confusion     Brain fog, increased somnolence    Flonase [Fluticasone] Other (See Comments)     Blisters around nose     Current Outpatient Medications   Medication Sig Dispense Refill    betamethasone dipropionate (DIPROSONE) 0.05 % cream APPLY TO AFFECTED AREA ON  HANDS TWICE A DAY FOR 2 WEEKS      cetirizine (zyrTEC) 10 MG tablet Take 1 tablet by mouth Daily.      ibuprofen (ADVIL,MOTRIN) 200 MG tablet Take 1 tablet by mouth Every 6 (Six) Hours As Needed.      methylPREDNISolone (MEDROL) 4 MG dose pack Take as directed on package instructions. 21 tablet 0    multivitamin (MULTI VITAMIN PO) Take 1 tablet by mouth Daily.      primidone (MYSOLINE) 50 MG tablet Take 4 tablets by mouth Every Night for 180 days. 360 tablet 1    sertraline (ZOLOFT) 25 MG tablet Take 1 tablet by mouth Daily. Along with the 50 mg sertraline for mood.  Take with food. 90 tablet 3    sertraline (ZOLOFT) 50 MG tablet Take 1 tablet by mouth Daily. Along with the 25 mg sertraline pill for mood.  Take with food. 90 tablet 3    vitamin D (ERGOCALCIFEROL) 1.25 MG (67335 UT) capsule capsule Take 1 capsule by mouth 1 (One) Time Per Week. 12 capsule 3     No current facility-administered medications for this visit.     Review of Systems   Constitutional:  Negative for chills and fever.   HENT:  Negative for congestion.    Respiratory:  Negative for shortness of breath.    Cardiovascular:  Negative for chest pain and leg swelling.   Gastrointestinal:  Negative for constipation, diarrhea, nausea and vomiting.   Musculoskeletal: Negative.         Foot pain     Skin:  Negative for wound.   Neurological:  Negative for numbness.       OBJECTIVE     Vitals:    07/01/25 1340   BP: 124/82   Pulse: 75   SpO2: 98%         PHYSICAL EXAM  GEN:   Accompanied by none.     Foot/Ankle Exam    GENERAL  Appearance:  appears stated age  Orientation:  AAOx3  Affect:  appropriate  Gait:  unimpaired  Assistance:  independent  Right shoe gear: casual shoe  Left shoe gear: casual shoe    VASCULAR     Right Foot Vascularity   Normal vascular exam    Dorsalis pedis:  2+  Posterior tibial:  2+  Skin temperature:  warm  Edema grading:  None  CFT:  < 3 seconds  Pedal hair growth:  Present  Varicosities:  none     Left Foot Vascularity    Normal vascular exam    Dorsalis pedis:  2+  Posterior tibial:  2+  Skin temperature:  warm  Edema grading:  None  CFT:  < 3 seconds  Pedal hair growth:  Present  Varicosities:  none     NEUROLOGIC     Right Foot Neurologic   Light touch sensation: normal  Vibratory sensation: normal  Hot/Cold sensation: normal  Protective Sensation using Colerain-David Monofilament:   Sites intact: 10  Sites tested: 10     Left Foot Neurologic   Light touch sensation: normal  Vibratory sensation: normal  Hot/Cold sensation:  normal  Protective Sensation using Colerain-David Monofilament:   Sites intact: 10  Sites tested: 10    MUSCULOSKELETAL     Right Foot Musculoskeletal   Ecchymosis:  none  Tenderness:  none    Arch:  Normal     Left Foot Musculoskeletal   Ecchymosis:  none  Tenderness:  none  Arch:  Normal    MUSCLE STRENGTH     Right Foot Muscle Strength   Foot dorsiflexion:  5  Foot plantar flexion:  5  Foot inversion:  5  Foot eversion:  5     Left Foot Muscle Strength   Foot dorsiflexion:  5  Foot plantar flexion:  5  Foot inversion:  5  Foot eversion:  5    RANGE OF MOTION     Right Foot Range of Motion   Ankle dorsiflexion: 5 decreased     DERMATOLOGIC      Right Foot Dermatologic   Skin  Right foot skin is intact.      Left Foot Dermatologic   Skin  Left foot skin is intact.     Image:       RADIOLOGY/NUCLEAR:  No results found.    LABORATORY/CULTURE RESULTS:      PATHOLOGY RESULTS:       ASSESSMENT/PLAN     Diagnoses and all orders for this visit:    1. Plantar fasciitis of right foot (Primary)  -     lidocaine (XYLOCAINE) 2% injection 1.5 mL  -     triamcinolone acetonide (KENALOG-40) injection 20 mg  -     dexAMETHasone (DECADRON) injection 10 mg        Comprehensive lower extremity examination and evaluation was performed.  Discussed findings and treatment plan including risks, benefits, and treatment options with patient in detail. Patient agreed with treatment plan.  PCP note reviewed.  Neurology note  reviewed.  Discussed with patient options for plantar fasciitis including conservative therapy, power step inserts, physical therapy, or plantar fascial injection.  Discussed with patient that conservative therapy includes stretching, rest, ice, and use of NSAIDs if not contraindicated.  Patient would like to have an injection today since she has had previous success with injections.  Discussed possibility of physical therapy, but patient reports she has attempted this previously and noted more pain after physical therapy.  After written consent obtained, plantar fascial injection performed as documented in procedure note. Post-procedure instructions given.  Conservative therapy including daily stretching (demonstrated proper way of performing), icing 3x daily, decreased activity, and nsaids PRN.   Continue supportive shoe gear and arch supports.  Patient will contact the office if she has any questions or concerns.  An After Visit Summary was printed and given to the patient at discharge, including (if requested) any available informative/educational handouts regarding diagnosis, treatment, or medications. All questions were answered to patient/family satisfaction. Should symptoms fail to improve or worsen they agree to call or return to clinic or to go to the Emergency Department. Discussed the importance of following up with any needed screening tests/labs/specialist appointments and any requested follow-up recommended by me today. Importance of maintaining follow-up discussed and patient accepts that missed appointments can delay diagnosis and potentially lead to worsening of conditions.  Return if symptoms worsen or fail to improve., or sooner if acute issues arise.      Injection Tendon or Ligament    Date/Time: 7/1/2025 1:55 PM    Performed by: Rosa Martel APRN  Authorized by: Rosa Martel APRN      Patient tolerance: patient tolerated the procedure well with no immediate complications  Comments: 1.5 mL  lidocaine, 1.0 mL dexamethasone, 0.5 mL Kenalog  Right foot  No waste    Laterality: right  Needle Size: 25  Approach: anterolateral       This document has been electronically signed by MARIA ALEJANDRA Ambrosio on July 1, 2025 17:23 CDT

## 2025-07-01 ENCOUNTER — OFFICE VISIT (OUTPATIENT)
Age: 65
End: 2025-07-01
Payer: MEDICARE

## 2025-07-01 VITALS
HEIGHT: 65 IN | BODY MASS INDEX: 32.99 KG/M2 | SYSTOLIC BLOOD PRESSURE: 124 MMHG | DIASTOLIC BLOOD PRESSURE: 82 MMHG | WEIGHT: 198 LBS | HEART RATE: 75 BPM | OXYGEN SATURATION: 98 %

## 2025-07-01 DIAGNOSIS — M72.2 PLANTAR FASCIITIS OF RIGHT FOOT: Primary | ICD-10-CM

## 2025-07-01 RX ORDER — LIDOCAINE HYDROCHLORIDE 20 MG/ML
1.5 INJECTION, SOLUTION INFILTRATION; PERINEURAL ONCE
Status: COMPLETED | OUTPATIENT
Start: 2025-07-01 | End: 2025-07-01

## 2025-07-01 RX ORDER — TRIAMCINOLONE ACETONIDE 40 MG/ML
0.5 INJECTION, SUSPENSION INTRA-ARTICULAR; INTRAMUSCULAR ONCE
Status: COMPLETED | OUTPATIENT
Start: 2025-07-01 | End: 2025-07-01

## 2025-07-01 RX ORDER — DEXAMETHASONE SODIUM PHOSPHATE 10 MG/ML
10 INJECTION, SOLUTION INTRA-ARTICULAR; INTRALESIONAL; INTRAMUSCULAR; INTRAVENOUS; SOFT TISSUE ONCE
Status: COMPLETED | OUTPATIENT
Start: 2025-07-01 | End: 2025-07-01

## 2025-07-01 RX ADMIN — LIDOCAINE HYDROCHLORIDE 1.5 ML: 20 INJECTION, SOLUTION INFILTRATION; PERINEURAL at 13:53

## 2025-07-01 RX ADMIN — TRIAMCINOLONE ACETONIDE 20 MG: 40 INJECTION, SUSPENSION INTRA-ARTICULAR; INTRAMUSCULAR at 13:52

## 2025-07-01 RX ADMIN — DEXAMETHASONE SODIUM PHOSPHATE 10 MG: 10 INJECTION, SOLUTION INTRA-ARTICULAR; INTRALESIONAL; INTRAMUSCULAR; INTRAVENOUS; SOFT TISSUE at 13:52

## 2025-07-04 ENCOUNTER — HOSPITAL ENCOUNTER (EMERGENCY)
Facility: HOSPITAL | Age: 65
Discharge: HOME | End: 2025-07-04
Attending: FAMILY MEDICINE
Payer: MEDICARE

## 2025-07-04 ENCOUNTER — APPOINTMENT (OUTPATIENT)
Dept: CT IMAGING | Facility: HOSPITAL | Age: 65
End: 2025-07-04
Payer: MEDICARE

## 2025-07-04 VITALS
DIASTOLIC BLOOD PRESSURE: 76 MMHG | RESPIRATION RATE: 18 BRPM | BODY MASS INDEX: 32.82 KG/M2 | HEART RATE: 84 BPM | SYSTOLIC BLOOD PRESSURE: 135 MMHG | OXYGEN SATURATION: 95 % | HEIGHT: 65 IN | WEIGHT: 197 LBS | TEMPERATURE: 98.3 F

## 2025-07-04 DIAGNOSIS — R10.84 GENERALIZED ABDOMINAL PAIN: ICD-10-CM

## 2025-07-04 DIAGNOSIS — K57.32 DIVERTICULITIS OF LARGE INTESTINE WITHOUT PERFORATION OR ABSCESS WITHOUT BLEEDING: ICD-10-CM

## 2025-07-04 DIAGNOSIS — K52.9 COLITIS: Primary | ICD-10-CM

## 2025-07-04 LAB
ALBUMIN SERPL-MCNC: 4.3 G/DL (ref 3.5–5.2)
ALBUMIN/GLOB SERPL: 2.2 G/DL
ALP SERPL-CCNC: 72 U/L (ref 39–117)
ALT SERPL W P-5'-P-CCNC: 20 U/L (ref 1–33)
ANION GAP SERPL CALCULATED.3IONS-SCNC: 11 MMOL/L (ref 5–15)
APTT PPP: 22.9 SECONDS (ref 24.5–36)
AST SERPL-CCNC: 19 U/L (ref 1–32)
BASOPHILS # BLD AUTO: 0.03 10*3/MM3 (ref 0–0.2)
BASOPHILS NFR BLD AUTO: 0.4 % (ref 0–1.5)
BILIRUB SERPL-MCNC: 0.3 MG/DL (ref 0–1.2)
BUN SERPL-MCNC: 11.8 MG/DL (ref 8–23)
BUN/CREAT SERPL: 19.7 (ref 7–25)
CALCIUM SPEC-SCNC: 8.4 MG/DL (ref 8.6–10.5)
CHLORIDE SERPL-SCNC: 108 MMOL/L (ref 98–107)
CO2 SERPL-SCNC: 22 MMOL/L (ref 22–29)
CREAT SERPL-MCNC: 0.6 MG/DL (ref 0.57–1)
DEPRECATED RDW RBC AUTO: 43.2 FL (ref 37–54)
EGFRCR SERPLBLD CKD-EPI 2021: 99.8 ML/MIN/1.73
EOSINOPHIL # BLD AUTO: 0.07 10*3/MM3 (ref 0–0.4)
EOSINOPHIL NFR BLD AUTO: 0.9 % (ref 0.3–6.2)
ERYTHROCYTE [DISTWIDTH] IN BLOOD BY AUTOMATED COUNT: 12.5 % (ref 12.3–15.4)
GEN 5 1HR TROPONIN T REFLEX: <6 NG/L
GLOBULIN UR ELPH-MCNC: 2 GM/DL
GLUCOSE SERPL-MCNC: 123 MG/DL (ref 65–99)
HCT VFR BLD AUTO: 42.4 % (ref 34–46.6)
HGB BLD-MCNC: 14.3 G/DL (ref 12–15.9)
IMM GRANULOCYTES # BLD AUTO: 0.02 10*3/MM3 (ref 0–0.05)
IMM GRANULOCYTES NFR BLD AUTO: 0.3 % (ref 0–0.5)
INR PPP: 0.92 (ref 0.91–1.09)
LIPASE SERPL-CCNC: 34 U/L (ref 13–60)
LYMPHOCYTES # BLD AUTO: 1.02 10*3/MM3 (ref 0.7–3.1)
LYMPHOCYTES NFR BLD AUTO: 13.8 % (ref 19.6–45.3)
MAGNESIUM SERPL-MCNC: 2.3 MG/DL (ref 1.6–2.4)
MCH RBC QN AUTO: 31.9 PG (ref 26.6–33)
MCHC RBC AUTO-ENTMCNC: 33.7 G/DL (ref 31.5–35.7)
MCV RBC AUTO: 94.6 FL (ref 79–97)
MONOCYTES # BLD AUTO: 0.43 10*3/MM3 (ref 0.1–0.9)
MONOCYTES NFR BLD AUTO: 5.8 % (ref 5–12)
NEUTROPHILS NFR BLD AUTO: 5.84 10*3/MM3 (ref 1.7–7)
NEUTROPHILS NFR BLD AUTO: 78.8 % (ref 42.7–76)
NRBC BLD AUTO-RTO: 0 /100 WBC (ref 0–0.2)
PLATELET # BLD AUTO: 139 10*3/MM3 (ref 140–450)
PMV BLD AUTO: 9.9 FL (ref 6–12)
POTASSIUM SERPL-SCNC: 4 MMOL/L (ref 3.5–5.2)
PROT SERPL-MCNC: 6.3 G/DL (ref 6–8.5)
PROTHROMBIN TIME: 12.8 SECONDS (ref 11.8–14.8)
QT INTERVAL: 394 MS
QTC INTERVAL: 443 MS
RBC # BLD AUTO: 4.48 10*6/MM3 (ref 3.77–5.28)
SODIUM SERPL-SCNC: 141 MMOL/L (ref 136–145)
TROPONIN T NUMERIC DELTA: NORMAL
TROPONIN T SERPL HS-MCNC: <6 NG/L
WBC NRBC COR # BLD AUTO: 7.41 10*3/MM3 (ref 3.4–10.8)

## 2025-07-04 PROCEDURE — 25010000002 METRONIDAZOLE 500 MG/100ML SOLUTION: Performed by: FAMILY MEDICINE

## 2025-07-04 PROCEDURE — 83690 ASSAY OF LIPASE: CPT | Performed by: FAMILY MEDICINE

## 2025-07-04 PROCEDURE — 84484 ASSAY OF TROPONIN QUANT: CPT | Performed by: FAMILY MEDICINE

## 2025-07-04 PROCEDURE — 85610 PROTHROMBIN TIME: CPT | Performed by: FAMILY MEDICINE

## 2025-07-04 PROCEDURE — 25510000001 IOPAMIDOL 61 % SOLUTION: Performed by: FAMILY MEDICINE

## 2025-07-04 PROCEDURE — 85730 THROMBOPLASTIN TIME PARTIAL: CPT | Performed by: FAMILY MEDICINE

## 2025-07-04 PROCEDURE — 80053 COMPREHEN METABOLIC PANEL: CPT | Performed by: FAMILY MEDICINE

## 2025-07-04 PROCEDURE — 25010000002 CEFTRIAXONE PER 250 MG: Performed by: FAMILY MEDICINE

## 2025-07-04 PROCEDURE — 83735 ASSAY OF MAGNESIUM: CPT | Performed by: FAMILY MEDICINE

## 2025-07-04 PROCEDURE — 96365 THER/PROPH/DIAG IV INF INIT: CPT

## 2025-07-04 PROCEDURE — 36415 COLL VENOUS BLD VENIPUNCTURE: CPT

## 2025-07-04 PROCEDURE — 96368 THER/DIAG CONCURRENT INF: CPT

## 2025-07-04 PROCEDURE — 85025 COMPLETE CBC W/AUTO DIFF WBC: CPT | Performed by: FAMILY MEDICINE

## 2025-07-04 PROCEDURE — 99285 EMERGENCY DEPT VISIT HI MDM: CPT | Performed by: FAMILY MEDICINE

## 2025-07-04 PROCEDURE — 93005 ELECTROCARDIOGRAM TRACING: CPT | Performed by: FAMILY MEDICINE

## 2025-07-04 PROCEDURE — 74177 CT ABD & PELVIS W/CONTRAST: CPT

## 2025-07-04 RX ORDER — METRONIDAZOLE 500 MG/1
500 TABLET ORAL 3 TIMES DAILY
Qty: 21 TABLET | Refills: 0 | Status: SHIPPED | OUTPATIENT
Start: 2025-07-04 | End: 2025-07-11

## 2025-07-04 RX ORDER — SODIUM CHLORIDE 0.9 % (FLUSH) 0.9 %
10 SYRINGE (ML) INJECTION AS NEEDED
Status: DISCONTINUED | OUTPATIENT
Start: 2025-07-04 | End: 2025-07-04 | Stop reason: HOSPADM

## 2025-07-04 RX ORDER — ONDANSETRON 4 MG/1
4 TABLET, ORALLY DISINTEGRATING ORAL EVERY 6 HOURS PRN
Qty: 20 TABLET | Refills: 0 | Status: SHIPPED | OUTPATIENT
Start: 2025-07-04

## 2025-07-04 RX ORDER — METRONIDAZOLE 500 MG/100ML
500 INJECTION, SOLUTION INTRAVENOUS ONCE
Status: COMPLETED | OUTPATIENT
Start: 2025-07-04 | End: 2025-07-04

## 2025-07-04 RX ORDER — IOPAMIDOL 612 MG/ML
100 INJECTION, SOLUTION INTRAVASCULAR
Status: COMPLETED | OUTPATIENT
Start: 2025-07-04 | End: 2025-07-04

## 2025-07-04 RX ORDER — CEFDINIR 300 MG/1
300 CAPSULE ORAL 2 TIMES DAILY
Qty: 14 CAPSULE | Refills: 0 | Status: SHIPPED | OUTPATIENT
Start: 2025-07-04 | End: 2025-07-11

## 2025-07-04 RX ADMIN — METRONIDAZOLE 500 MG: 500 INJECTION, SOLUTION INTRAVENOUS at 11:43

## 2025-07-04 RX ADMIN — IOPAMIDOL 100 ML: 612 INJECTION, SOLUTION INTRAVENOUS at 10:00

## 2025-07-04 RX ADMIN — CEFTRIAXONE SODIUM 1000 MG: 1 INJECTION, POWDER, FOR SOLUTION INTRAMUSCULAR; INTRAVENOUS at 11:43

## 2025-07-04 NOTE — ED PROVIDER NOTES
HPI:     Patient is a 65-year-old white female presents to the emergency room with sudden onset of lower abdominal pain at 430 this morning that woke her up from sleep she stated the pain was 11 out of 10!.  Patient has a past history of diverticulitis.  She states that this feels a little worse than the past time she has had it.  She has had C-sections in the past.  She took no pain medicine prior to arrival.  Patient denies any chest pain shortness of breath or diaphoresis.  Patient denies any trauma.  Current pain 3-4 out of 10.      REVIEW OF SYSTEMS      CONSTITUTIONAL:  No complaints of fever, chills,or weakness  EYES:  No complaints of discharge   ENT: No complaints of sore throat or ear pain  CARDIOVASCULAR:  No complaints of chest pain, palpitations, or swelling  RESPIRATORY:  No complaints of cough or shortness of breath  GI: Positive for nausea and with lower abdominal pain sharp and stabbing in nature  MUSCULOSKELETAL:  No complaints of back pain  SKIN:  No complaints of rash  NEUROLOGIC:  No complaints of headache, focal weakness, or sensory changes  ENDOCRINE:  No complaints of polyuria or polydipsia  LYMPHATIC:  No complaints of swollen glands  GENITOURINARY: No complaints of urinary frequency or hematuria      PAST MEDICAL HISTORY  Past Medical History:   Diagnosis Date    Colon polyp 5 Years ago    Depression     Difficulty walking     Diverticulosis 5 years ago    Family history of colonic polyps     History of colon polyps     Low back pain     Multiple gestation Oct. 1988    Obesity     Plantar fasciitis     PMS (premenstrual syndrome)  [id number]    Seasonal allergies     Sleep apnea     Never officially tested    Tremor     Varicella 1970       FAMILY HISTORY  Family History   Problem Relation Age of Onset    Diabetes type II Father     Heart disease Father     Stroke Father              COPD Father     Diabetes Father     Hearing loss Father         Hearing issues on my father's  side of the family    Coronary artery disease Father     Breast cancer Mother              Migraines Mother     Cancer Mother     Melanoma Sister         Small, removed, no reoccurance.    Hearing loss Sister         tumor    Multiple sclerosis Sister     Thyroid disease Maternal Grandmother     Dementia Maternal Grandmother     Colon polyps Maternal Uncle         Unknown age    Alcohol abuse Maternal Uncle     Drug abuse Maternal Uncle     Colon cancer Neg Hx     Esophageal cancer Neg Hx     Liver disease Neg Hx     Ulcerative colitis Neg Hx     Rectal cancer Neg Hx     Stomach cancer Neg Hx     Ovarian cancer Neg Hx     Uterine cancer Neg Hx        SOCIAL HISTORY  Social History     Socioeconomic History    Marital status:    Tobacco Use    Smoking status: Former     Current packs/day: 0.00     Average packs/day: 0.5 packs/day for 22.3 years (11.2 ttl pk-yrs)     Types: Cigarettes     Start date: 1977     Quit date: 1999     Years since quittin.6     Passive exposure: Past    Smokeless tobacco: Never    Tobacco comments:        Vaping Use    Vaping status: Never Used   Substance and Sexual Activity    Alcohol use: Yes     Comment: Drink every evening, amounts and type vary.    Drug use: Never    Sexual activity: Not Currently     Partners: Male     Birth control/protection: Post-menopausal       IMMUNIZATION HISTORY  Deferred to primary care physician.    SURGICAL HISTORY  Past Surgical History:   Procedure Laterality Date     SECTION  1989 & 9.7.1995    X 2    COLONOSCOPY  5 years ago    I have one scheduled for Fri, 3/18    COLONOSCOPY N/A 2022    Procedure: COLONOSCOPY WITH ANESTHESIA;  Surgeon: Keysha Disla MD;  Location: [facility] ENDOSCOPY;  Service: Gastroenterology;  Laterality: N/A;  pre screen  post diverticulosis,polyp  Dr. Garcia    DENTAL PROCEDURE      implants x 2    MOLE REMOVAL      From nose       CURRENT MEDICATIONS    Current  Facility-Administered Medications:     cefTRIAXone (ROCEPHIN) 1,000 mg in sodium chloride 0.9 % 100 mL MBP, 1,000 mg, Intravenous, Once, Daryl Manuel Jr., MD    metroNIDAZOLE (FLAGYL) IVPB 500 mg, 500 mg, Intravenous, Once, Daryl Manuel Jr., MD    [COMPLETED] Insert Peripheral IV, , , Once **AND** sodium chloride 0.9 % flush 10 mL, 10 mL, Intravenous, PRN, Daryl Manuel Jr., MD    Current Outpatient Medications:     betamethasone dipropionate (DIPROSONE) 0.05 % cream, APPLY TO AFFECTED AREA ON HANDS TWICE A DAY FOR 2 WEEKS, Disp: , Rfl:     cefdinir (OMNICEF) 300 MG capsule, Take 1 capsule by mouth 2 (Two) Times a Day for 7 days., Disp: 14 capsule, Rfl: 0    cetirizine (zyrTEC) 10 MG tablet, Take 1 tablet by mouth Daily., Disp: , Rfl:     ibuprofen (ADVIL,MOTRIN) 200 MG tablet, Take 1 tablet by mouth Every 6 (Six) Hours As Needed., Disp: , Rfl:     methylPREDNISolone (MEDROL) 4 MG dose pack, Take as directed on package instructions., Disp: 21 tablet, Rfl: 0    metroNIDAZOLE (FLAGYL) 500 MG tablet, Take 1 tablet by mouth 3 (Three) Times a Day for 7 days., Disp: 21 tablet, Rfl: 0    multivitamin (MULTI VITAMIN PO), Take 1 tablet by mouth Daily., Disp: , Rfl:     ondansetron ODT (ZOFRAN-ODT) 4 MG disintegrating tablet, Place 1 tablet on the tongue Every 6 (Six) Hours As Needed for Nausea or Vomiting., Disp: 20 tablet, Rfl: 0    primidone (MYSOLINE) 50 MG tablet, Take 4 tablets by mouth Every Night for 180 days., Disp: 360 tablet, Rfl: 1    sertraline (ZOLOFT) 25 MG tablet, Take 1 tablet by mouth Daily. Along with the 50 mg sertraline for mood.  Take with food., Disp: 90 tablet, Rfl: 3    sertraline (ZOLOFT) 50 MG tablet, Take 1 tablet by mouth Daily. Along with the 25 mg sertraline pill for mood.  Take with food., Disp: 90 tablet, Rfl: 3    vitamin D (ERGOCALCIFEROL) 1.25 MG (06644 UT) capsule capsule, Take 1 capsule by mouth 1 (One) Time Per Week., Disp: 12 capsule, Rfl:  "3    ALLERGIES  Allergies   Allergen Reactions    Morphine Itching and Swelling           Topamax [Topiramate] Confusion     Brain fog, increased somnolence    Flonase [Fluticasone] Other (See Comments)     Blisters around nose       ABDOMINAL EXAM    VITAL SIGNS:   /80 (BP Location: Right arm, Patient Position: Lying)   Pulse 82   Temp 98.3 °F (36.8 °C) (Oral)   Resp 16   Ht 165.1 cm (65\")   Wt 89.4 kg (197 lb)   SpO2 94%   BMI 32.78 kg/m²     Constitutional: Patient is alert and in no distress.  Patient with moderate lower abdominal  discomfort.    ENT: There is a normal pharynx with no acute erythema or exudate and oral mucosa is moist.  Nose is clear with no drainage.  Tympanic membranes intact and non-erythemic    Cardiovascular: S1-S2 regular rate and rhythm. No murmurs, rubs or gallops.  Pulses are equal bilaterally and there is no pitting edema.    Respiratory: Patient is clear to auscultation bilaterally with no wheezing or rhonchi.  Chest wall is nontender.  There are no external lesions on the chest.  There is no crepitance.    Gastrointestinal: Tenderness in the lower half of the abdomen greatest in the left lower quadrant and left mid lower quadrant.  Bowel sounds are normal in all 4 quadrants.  There is no McBurney's point rebound.  There is no abdominal distention or fluid wave.    Genitourinary: Patient is voiding appropriately.    Integument: No acute lesions noted.  Color appears to be normal.    [place] Coma Scale: Total score 15    Neurological: Patient is alert and oriented x4 and no acute findings noted.  Speech is fluent and cognition is normal.  No evidence of acute CVA.  Cranial nerves II through XII intact.  Patient with normal motor function as well as reflexes and sensation    Psychiatric: Normal affect and mood.            RADIOLOGY/PROCEDURES        CT Abdomen Pelvis With Contrast   Final Result       1.  There is some subtle wall thickening of the descending and "   transverse colon. Mild adjacent pericolonic fat stranding in the LEFT   paracolic gutter may suggest mild colitis. I don't see a definite   focally inflamed diverticulum to suggest acute diverticulitis on this   exam. There are a few colonic diverticula and a subtle acute   diverticulitis would be another consideration.        2.  Hepatic steatosis.       This report was signed and finalized on 7/4/2025 10:18 AM by Dr. Rickie Jimenez MD.                 FUTURE APPOINTMENTS     Future Appointments   Date Time Provider Department Center   7/8/2025  1:00 PM Yoanna Masters MD [facility]   1/5/2026 10:30 AM Ivy Addison APRN [facility]          EKG (reviewed and interpreted by me):     Normal sinus rhythm with marked sinus arrhythmia with a ventricular rate of 76 no acute ST segment elevation or depression            COURSE & MEDICAL DECISION MAKING       Patient's partial differential diagnosis can include    diverticulitis, diverticulosis, pancreatitis, gastritis, gastroenteritis, colitis, enteritis, cholecystitis, partial bowel obstruction, bowel obstruction,volvulus, intussusception, constipation, irritable bowel, , GERD,  Crohn's disease, ulcerative colitis, perforated peptic ulcer,celiac disease Acute appendicitis,AAA, peptic ulcer disease, perforated esophagus,  mesenteric adenitis, mesenteric ischemia, , esophageal spasms, gastroparesis, and others      CBC, CMP, magnesium, PT, PTT, lipase, CT abdomen pelvis without contrast    CT scan says transverse colon and descending colon with inflammation consistent with colitis subacute diverticulitis.  Patient will be given Rocephin and Flagyl.  Sent home with Omnicef and Flagyl.  She feels comfortable with this plan.  She would also like some Zofran which was sent home    Patient still does not want any type of pain medication    Patient's level of risk: Moderate       CRITICAL CARE    CRITICAL CARE: No    CRITICAL CARE TIME: None      Recent  Results (from the past 24 hours)   Comprehensive Metabolic Panel    Collection Time: 07/04/25  8:27 AM    Specimen: Arm, Left; Blood   Result Value Ref Range    Glucose 123 (H) 65 - 99 mg/dL    BUN 11.8 8.0 - 23.0 mg/dL    Creatinine 0.60 0.57 - 1.00 mg/dL    Sodium 141 136 - 145 mmol/L    Potassium 4.0 3.5 - 5.2 mmol/L    Chloride 108 (H) 98 - 107 mmol/L    CO2 22.0 22.0 - 29.0 mmol/L    Calcium 8.4 (L) 8.6 - 10.5 mg/dL    Total Protein 6.3 6.0 - 8.5 g/dL    Albumin 4.3 3.5 - 5.2 g/dL    ALT (SGPT) 20 1 - 33 U/L    AST (SGOT) 19 1 - 32 U/L    Alkaline Phosphatase 72 39 - 117 U/L    Total Bilirubin 0.3 0.0 - 1.2 mg/dL    Globulin 2.0 gm/dL    A/G Ratio 2.2 g/dL    BUN/Creatinine Ratio 19.7 7.0 - 25.0    Anion Gap 11.0 5.0 - 15.0 mmol/L    eGFR 99.8 >60.0 mL/min/1.73   Protime-INR    Collection Time: 07/04/25  8:27 AM    Specimen: Arm, Left; Blood   Result Value Ref Range    Protime 12.8 11.8 - 14.8 Seconds    INR 0.92 0.91 - 1.09   aPTT    Collection Time: 07/04/25  8:27 AM    Specimen: Arm, Left; Blood   Result Value Ref Range    PTT 22.9 (L) 24.5 - 36.0 seconds   Lipase    Collection Time: 07/04/25  8:27 AM    Specimen: Arm, Left; Blood   Result Value Ref Range    Lipase 34 13 - 60 U/L   Magnesium    Collection Time: 07/04/25  8:27 AM    Specimen: Arm, Left; Blood   Result Value Ref Range    Magnesium 2.3 1.6 - 2.4 mg/dL   CBC Auto Differential    Collection Time: 07/04/25  8:27 AM    Specimen: Arm, Left; Blood   Result Value Ref Range    WBC 7.41 3.40 - 10.80 10*3/mm3    RBC 4.48 3.77 - 5.28 10*6/mm3    Hemoglobin 14.3 12.0 - 15.9 g/dL    Hematocrit 42.4 34.0 - 46.6 %    MCV 94.6 79.0 - 97.0 fL    MCH 31.9 26.6 - 33.0 pg    MCHC 33.7 31.5 - 35.7 g/dL    RDW 12.5 12.3 - 15.4 %    RDW-SD 43.2 37.0 - 54.0 fl    MPV 9.9 6.0 - 12.0 fL    Platelets 139 (L) 140 - 450 10*3/mm3    Neutrophil % 78.8 (H) 42.7 - 76.0 %    Lymphocyte % 13.8 (L) 19.6 - 45.3 %    Monocyte % 5.8 5.0 - 12.0 %    Eosinophil % 0.9 0.3 - 6.2 %     Basophil % 0.4 0.0 - 1.5 %    Immature Grans % 0.3 0.0 - 0.5 %    Neutrophils, Absolute 5.84 1.70 - 7.00 10*3/mm3    Lymphocytes, Absolute 1.02 0.70 - 3.10 10*3/mm3    Monocytes, Absolute 0.43 0.10 - 0.90 10*3/mm3    Eosinophils, Absolute 0.07 0.00 - 0.40 10*3/mm3    Basophils, Absolute 0.03 0.00 - 0.20 10*3/mm3    Immature Grans, Absolute 0.02 0.00 - 0.05 10*3/mm3    nRBC 0.0 0.0 - 0.2 /100 WBC   High Sensitivity Troponin T    Collection Time: 07/04/25  8:27 AM    Specimen: Arm, Left; Blood   Result Value Ref Range    HS Troponin T <6 <14 ng/L   ECG 12 Lead Other; Abdominal pain    Collection Time: 07/04/25  9:02 AM   Result Value Ref Range    QT Interval 394 ms    QTC Interval 443 ms   High Sensitivity Troponin T 1Hr    Collection Time: 07/04/25 10:37 AM    Specimen: Blood   Result Value Ref Range    HS Troponin T <6 <14 ng/L    Troponin T Numeric Delta            Old charts were reviewed per [facility] EMR.  Pertinent details are summarized above.  All laboratory, radiologic, and EKG studies that were performed in the Emergency Department were a necessary part of the evaluation needed to exclude unstable or  emergent medical conditions.     Patient was hemodynamically and neurologically stable in the ED.   Pertinent studies were reviewed as above.     The patient received:  Medications   sodium chloride 0.9 % flush 10 mL (has no administration in time range)   metroNIDAZOLE (FLAGYL) IVPB 500 mg (has no administration in time range)   cefTRIAXone (ROCEPHIN) 1,000 mg in sodium chloride 0.9 % 100 mL MBP (has no administration in time range)   iopamidol (ISOVUE-300) 61 % injection 100 mL (100 mL Intravenous Given 7/4/25 1000)            Diagnoses that have been ruled out:   None   Diagnoses that are still under consideration:   None   Final diagnoses:   Colitis   Diverticulitis of large intestine without perforation or abscess without bleeding   Generalized abdominal pain        FINAL IMPRESSION   Diagnosis Plan   1.  Colitis        2. Diverticulitis of large intestine without perforation or abscess without bleeding        3. Generalized abdominal pain              Daryl Manuel Jr, MD        ED Disposition       ED Disposition   Discharge    Condition   Stable    Comment   --                 Dragon disclaimer:  Part of this note may be an electronic transcription/translation of spoken language to printed text using the Dragon Dictation System.     I have reviewed the patient’s prescription history via a prescription monitoring program.  This information is consistent with my knowledge of the patient’s controlled substance use history.        Daryl Manuel Jr., MD  07/04/25 1138       Daryl Manuel Jr., MD  07/04/25 1139

## 2025-07-08 ENCOUNTER — OFFICE VISIT (OUTPATIENT)
Dept: SURGERY | Facility: CLINIC | Age: 65
End: 2025-07-08
Payer: MEDICARE

## 2025-07-08 ENCOUNTER — OFFICE VISIT (OUTPATIENT)
Dept: NEUROLOGY | Facility: CLINIC | Age: 65
End: 2025-07-08
Payer: MEDICARE

## 2025-07-08 VITALS
HEART RATE: 81 BPM | SYSTOLIC BLOOD PRESSURE: 140 MMHG | BODY MASS INDEX: 31.99 KG/M2 | WEIGHT: 192 LBS | OXYGEN SATURATION: 98 % | DIASTOLIC BLOOD PRESSURE: 80 MMHG | HEIGHT: 65 IN

## 2025-07-08 VITALS
WEIGHT: 197 LBS | BODY MASS INDEX: 32.82 KG/M2 | SYSTOLIC BLOOD PRESSURE: 148 MMHG | HEIGHT: 65 IN | DIASTOLIC BLOOD PRESSURE: 92 MMHG

## 2025-07-08 DIAGNOSIS — G25.0 ESSENTIAL TREMOR: ICD-10-CM

## 2025-07-08 DIAGNOSIS — K57.32 DIVERTICULITIS OF COLON: Primary | ICD-10-CM

## 2025-07-08 PROCEDURE — 99214 OFFICE O/P EST MOD 30 MIN: CPT | Performed by: PSYCHIATRY & NEUROLOGY

## 2025-07-08 RX ORDER — PRIMIDONE 50 MG/1
200 TABLET ORAL NIGHTLY
Qty: 360 TABLET | Refills: 3 | Status: SHIPPED | OUTPATIENT
Start: 2025-07-08 | End: 2026-07-08

## 2025-07-08 NOTE — PROGRESS NOTES
Chief Complaint  No chief complaint on file.    Subjective        VARUN Meyers presents to [facility]    History of Present Illness  66 yo female here for f/u of tremor. Tremor comes and goes. Little better today. Overall stable.                Current Outpatient Medications:     betamethasone dipropionate (DIPROSONE) 0.05 % cream, APPLY TO AFFECTED AREA ON HANDS TWICE A DAY FOR 2 WEEKS, Disp: , Rfl:     cefdinir (OMNICEF) 300 MG capsule, Take 1 capsule by mouth 2 (Two) Times a Day for 7 days., Disp: 14 capsule, Rfl: 0    cetirizine (zyrTEC) 10 MG tablet, Take 1 tablet by mouth Daily., Disp: , Rfl:     ibuprofen (ADVIL,MOTRIN) 200 MG tablet, Take 1 tablet by mouth Every 6 (Six) Hours As Needed., Disp: , Rfl:     methylPREDNISolone (MEDROL) 4 MG dose pack, Take as directed on package instructions., Disp: 21 tablet, Rfl: 0    metroNIDAZOLE (FLAGYL) 500 MG tablet, Take 1 tablet by mouth 3 (Three) Times a Day for 7 days., Disp: 21 tablet, Rfl: 0    multivitamin (MULTI VITAMIN PO), Take 1 tablet by mouth Daily., Disp: , Rfl:     ondansetron ODT (ZOFRAN-ODT) 4 MG disintegrating tablet, Place 1 tablet on the tongue Every 6 (Six) Hours As Needed for Nausea or Vomiting., Disp: 20 tablet, Rfl: 0    primidone (MYSOLINE) 50 MG tablet, Take 4 tablets by mouth Every Night for 180 days., Disp: 360 tablet, Rfl: 1    sertraline (ZOLOFT) 25 MG tablet, Take 1 tablet by mouth Daily. Along with the 50 mg sertraline for mood.  Take with food., Disp: 90 tablet, Rfl: 3    sertraline (ZOLOFT) 50 MG tablet, Take 1 tablet by mouth Daily. Along with the 25 mg sertraline pill for mood.  Take with food., Disp: 90 tablet, Rfl: 3    vitamin D (ERGOCALCIFEROL) 1.25 MG (60771 UT) capsule capsule, Take 1 capsule by mouth 1 (One) Time Per Week., Disp: 12 capsule, Rfl: 3       Objective   Vital Signs:   There were no vitals taken for this visit.    Physical Exam  Constitutional:       General: She is awake.   Eyes:       Extraocular Movements: Extraocular movements intact.      Pupils: Pupils are equal, round, and reactive to light.   Neurological:      Mental Status: She is alert.      Deep Tendon Reflexes: Reflexes are normal and symmetric.   Psychiatric:         Speech: Speech normal.      Minimal low amplitude postural tremor with arms outstretched    Result Review :                     Assessment and Plan   65-year-old female with essential tremor.  She has had a good response from primidone.  Increased at last visit due to her reporting some breakthrough symptoms.  This did help initially but more recently it has been worsening.  We discussed some options.  She previously had been on Topamax which she was unable to tolerate even at very low dose.  We had held off on trying propranolol in the past due to her history of depression. Has had improvement with primidone. She is not having any side effects or increase sleepiness/grogginess.    1.  Continue primidone 200 mg nightly.  2.  F/u 1 year.       No follow-ups on file.  Patient was given instructions and counseling regarding her condition or for health maintenance advice. Please see specific information pulled into the AVS if appropriate.

## 2025-07-09 ENCOUNTER — OFFICE VISIT (OUTPATIENT)
Dept: FAMILY MEDICINE CLINIC | Facility: CLINIC | Age: 65
End: 2025-07-09
Payer: MEDICARE

## 2025-07-09 ENCOUNTER — PATIENT ROUNDING (BHMG ONLY) (OUTPATIENT)
Dept: SURGERY | Facility: CLINIC | Age: 65
End: 2025-07-09
Payer: COMMERCIAL

## 2025-07-09 VITALS
DIASTOLIC BLOOD PRESSURE: 90 MMHG | TEMPERATURE: 97.4 F | HEART RATE: 86 BPM | OXYGEN SATURATION: 97 % | WEIGHT: 197.4 LBS | SYSTOLIC BLOOD PRESSURE: 140 MMHG | HEIGHT: 65 IN | BODY MASS INDEX: 32.89 KG/M2

## 2025-07-09 DIAGNOSIS — K57.92 DIVERTICULITIS: Primary | ICD-10-CM

## 2025-07-09 LAB
QT INTERVAL: 394 MS
QTC INTERVAL: 443 MS

## 2025-07-09 PROCEDURE — 1126F AMNT PAIN NOTED NONE PRSNT: CPT | Performed by: FAMILY MEDICINE

## 2025-07-09 PROCEDURE — 99213 OFFICE O/P EST LOW 20 MIN: CPT | Performed by: FAMILY MEDICINE

## 2025-07-09 NOTE — PROGRESS NOTES
GENERAL SURGERY OFFICE NEW PATIENT HISTORY AND PHYSICAL    Referring Provider: No ref. provider found  Primary Care Provider: Solomon Edward DO    Chief Complaint   Patient presents with    Follow-up       Subjective .     History of present illness:  VARUN Meyers is a 65 y.o. female who presents for evaluation of recurrent sigmoid diverticulitis.  She had her first known episode approximately 14 months ago.  She began developing recurrent symptoms of sigmoid diverticulitis last Friday therefore she presented to the emergency department where she had labs drawn that were unremarkable, however a CT of the abdomen and pelvis showed thickening of the descending and transverse colon and mild pericolonic fat stranding in the left colon suggestive of uncomplicated diverticulitis.  She was placed on Omnicef and Flagyl, which she is taking as directed.  She does report some improvement but is still having lower abdominal pain.  Her last colonoscopy was in  which showed diverticulosis of the left colon and one 4 mm polyp in the transverse colon that was resected which was negative for polyposis on pathology.      History:  Past Medical History:   Diagnosis Date    Colon polyp 5 Years ago    Depression     Difficulty walking     Diverticulosis 5 years ago    Family history of colonic polyps     History of colon polyps     Low back pain     Multiple gestation Oct. 1988    Obesity     Plantar fasciitis     PMS (premenstrual syndrome)  thru     Seasonal allergies     Sleep apnea     Never officially tested    Tremor     Varicella 1970   ,   Past Surgical History:   Procedure Laterality Date     SECTION  1989 & 9.7.1995    X 2    COLONOSCOPY  5 years ago    I have one scheduled for Fri, 3/18    COLONOSCOPY N/A 2022    Procedure: COLONOSCOPY WITH ANESTHESIA;  Surgeon: Keysha Disla MD;  Location: [facility] ENDOSCOPY;  Service: Gastroenterology;  Laterality: N/A;  pre screen  post  diverticulosis,polyp  Dr. Garcia    DENTAL PROCEDURE      implants x 2    MOLE REMOVAL      From nose   ,   Family History   Problem Relation Age of Onset    Diabetes type II Father     Heart disease Father     Stroke Father              COPD Father     Diabetes Father     Hearing loss Father         Hearing issues on my father's side of the family    Coronary artery disease Father     Breast cancer Mother              Migraines Mother     Cancer Mother     Melanoma Sister         Small, removed, no reoccurance.    Hearing loss Sister         tumor    Multiple sclerosis Sister     Thyroid disease Maternal Grandmother     Dementia Maternal Grandmother     Colon polyps Maternal Uncle         Unknown age    Alcohol abuse Maternal Uncle     Drug abuse Maternal Uncle     Colon cancer Neg Hx     Esophageal cancer Neg Hx     Liver disease Neg Hx     Ulcerative colitis Neg Hx     Rectal cancer Neg Hx     Stomach cancer Neg Hx     Ovarian cancer Neg Hx     Uterine cancer Neg Hx    ,   Social History     Tobacco Use    Smoking status: Former     Current packs/day: 0.00     Average packs/day: 0.5 packs/day for 22.3 years (11.2 ttl pk-yrs)     Types: Cigarettes     Start date: 1977     Quit date: 1999     Years since quittin.6     Passive exposure: Past    Smokeless tobacco: Never    Tobacco comments:        Vaping Use    Vaping status: Never Used   Substance Use Topics    Alcohol use: Yes     Comment: Drink every evening, amounts and type vary.    Drug use: Never       Current Outpatient Medications:     cetirizine (zyrTEC) 10 MG tablet, Take 1 tablet by mouth Daily., Disp: , Rfl:     ibuprofen (ADVIL,MOTRIN) 200 MG tablet, Take 1 tablet by mouth Every 6 (Six) Hours As Needed., Disp: , Rfl:     multivitamin (MULTI VITAMIN PO), Take 1 tablet by mouth Daily. (Patient not taking: Reported on 2025), Disp: , Rfl:     ondansetron ODT (ZOFRAN-ODT) 4 MG disintegrating tablet, Place 1  "tablet on the tongue Every 6 (Six) Hours As Needed for Nausea or Vomiting. (Patient not taking: Reported on 7/9/2025), Disp: 20 tablet, Rfl: 0    sertraline (ZOLOFT) 25 MG tablet, Take 1 tablet by mouth Daily. Along with the 50 mg sertraline for mood.  Take with food., Disp: 90 tablet, Rfl: 3    sertraline (ZOLOFT) 50 MG tablet, Take 1 tablet by mouth Daily. Along with the 25 mg sertraline pill for mood.  Take with food., Disp: 90 tablet, Rfl: 3    vitamin D (ERGOCALCIFEROL) 1.25 MG (46492 UT) capsule capsule, Take 1 capsule by mouth 1 (One) Time Per Week., Disp: 12 capsule, Rfl: 3    primidone (MYSOLINE) 50 MG tablet, Take 4 tablets by mouth Every Night., Disp: 360 tablet, Rfl: 3    rifAXIMin (XIFAXAN) 200 MG tablet, Take 2 tablets by mouth 2 (Two) Times a Day for 7 days. (Patient not taking: Reported on 7/9/2025), Disp: 28 tablet, Rfl: 0    Current Facility-Administered Medications:     amoxicillin-clavulanate (AUGMENTIN) 875-125 MG per tablet 1 tablet, 1 tablet, Oral, Q12H, Real Hood MD    Allergies:  Morphine, Topamax [topiramate], and Flonase [fluticasone]      The following portions of the patient's history were reviewed and updated as appropriate: allergies, current medications, past family history, past medical history, past social history, past surgical history, and problem list.      Review of Systems  A comprehensive 14 point review of systems was performed and is negative unless otherwise noted      Objective   /92   Ht 165.1 cm (65\")   Wt 89.4 kg (197 lb)   BMI 32.78 kg/m²     BMI followup discussion/instruction with patient: continue with current weight loss program, educational material, exercise counseling, nutrition counseling, and Information on healthy weight added to patient's after visit summary.      Physical Exam  Constitutional:       Appearance: Normal appearance. She is normal weight.      Comments: Pleasant middle-aged female, in no acute distress. Normal development, normal " body habitus. Well nourished   HENT:      Head: Normocephalic and atraumatic.      Right Ear: External ear normal.      Left Ear: External ear normal.      Ears:      Comments: Hearing intact     Nose: Nose normal.      Comments: Nares patent, no septal deviation, no drainage     Mouth/Throat:      Comments: Airway patent, dentition intact, mucus membranes moist  Eyes:      Extraocular Movements: Extraocular movements intact.      Conjunctiva/sclera: Conjunctivae normal.      Pupils: Pupils are equal, round, and reactive to light.      Comments: External eyelids grossly normal, vision intact, no scleral icterus   Neck:      Comments: Trachea midline  Cardiovascular:      Rate and Rhythm: Normal rate.      Comments: Normotensive, no JVD bilaterally  Pulmonary:      Effort: Pulmonary effort is normal.      Breath sounds: Normal breath sounds.      Comments: Normal respiratory rate  Abdominal:      General: Abdomen is flat.      Palpations: Abdomen is soft.      Comments: Mild to moderate tender in the lower abdomen with grimacing.  No diffuse peritonitis.  No scars, hernias or masses.   Musculoskeletal:         General: Normal range of motion.      Cervical back: Normal range of motion.   Skin:     General: Skin is warm and dry.      Comments: Skin color is consistent with ethnicity   Neurological:      General: No focal deficit present.      Mental Status: She is alert and oriented to person, place, and time.   Psychiatric:         Mood and Affect: Mood normal.         Behavior: Behavior normal.         Thought Content: Thought content normal.         Judgment: Judgment normal.      Comments: Patient understands disease process and has decision making capacity.              Results:  Labs:  I personally reviewed all pertinent labs.   Imaging: I personally reviewed all pertinent imaging studies.   Pathology:        Assessment & Plan   Diagnoses and all orders for this visit:    1. Diverticulitis of colon (Primary)  -      amoxicillin-clavulanate (AUGMENTIN) 875-125 MG per tablet 1 tablet    Other orders  -     rifAXIMin (XIFAXAN) 200 MG tablet; Take 2 tablets by mouth 2 (Two) Times a Day for 7 days. (Patient not taking: Reported on 7/9/2025)  Dispense: 28 tablet; Refill: 0    65-year-old female with recurrent diverticulitis of the sigmoid colon.  This is her second episode, with her first episode occurring about 14 months ago.  I will switch her from Omnicef and Flagyl to Augmentin for 7 days.  Additionally, I will trial her on rifaximin for symptomatic uncomplicated diverticular disease to see if this can minimize her symptoms.  I have explained to the patient that if she is not markedly improved by next week, we will repeat a CT of the abdomen pelvis.  Follow-up in 1 month.  Sooner if needed.         Thank you for the referral and trusting me with the care of your patient.    Real Hood MD, FACS  General Surgery  7/9/2025  15:38 CDT    Please note that portions of this note were completed with a voice recognition program.

## 2025-07-09 NOTE — PROGRESS NOTES
CC:   Chief Complaint   Patient presents with    Follow-up     Patient presents to clinic for a follow up from an ED visit on 07/04/25 for colitis       History:  VARUN Meyers is a 65 y.o. female who presents today for follow-up for evaluation of the above:    History of Present Illness  Patient is here for follow-up of diverticulitis diagnosed in the ED on 7/4/2025.  She was discharged with cefdinir and Flagyl, however yesterday she was seen by general surgery and was switched to Augmentin and Xifaxan.  Xifaxan has not been picked up due to either coverage or prior authorization that needs complete.  Belly pain is better, no fevers or chills, but patient still feels uncomfortable       Ms. Meyers  reports that she quit smoking about 25 years ago. Her smoking use included cigarettes. She started smoking about 47 years ago. She has a 11.2 pack-year smoking history. She has been exposed to tobacco smoke. She has never used smokeless tobacco. She reports current alcohol use. She reports that she does not use drugs.      Current Outpatient Medications:     cetirizine (zyrTEC) 10 MG tablet, Take 1 tablet by mouth Daily., Disp: , Rfl:     ibuprofen (ADVIL,MOTRIN) 200 MG tablet, Take 1 tablet by mouth Every 6 (Six) Hours As Needed., Disp: , Rfl:     primidone (MYSOLINE) 50 MG tablet, Take 4 tablets by mouth Every Night., Disp: 360 tablet, Rfl: 3    sertraline (ZOLOFT) 25 MG tablet, Take 1 tablet by mouth Daily. Along with the 50 mg sertraline for mood.  Take with food., Disp: 90 tablet, Rfl: 3    sertraline (ZOLOFT) 50 MG tablet, Take 1 tablet by mouth Daily. Along with the 25 mg sertraline pill for mood.  Take with food., Disp: 90 tablet, Rfl: 3    vitamin D (ERGOCALCIFEROL) 1.25 MG (92020 UT) capsule capsule, Take 1 capsule by mouth 1 (One) Time Per Week., Disp: 12 capsule, Rfl: 3    multivitamin (MULTI VITAMIN PO), Take 1 tablet by mouth Daily. (Patient not taking: Reported on 7/9/2025), Disp: , Rfl:      "ondansetron ODT (ZOFRAN-ODT) 4 MG disintegrating tablet, Place 1 tablet on the tongue Every 6 (Six) Hours As Needed for Nausea or Vomiting. (Patient not taking: Reported on 7/9/2025), Disp: 20 tablet, Rfl: 0    rifAXIMin (XIFAXAN) 200 MG tablet, Take 2 tablets by mouth 2 (Two) Times a Day for 7 days. (Patient not taking: Reported on 7/9/2025), Disp: 28 tablet, Rfl: 0    Current Facility-Administered Medications:     amoxicillin-clavulanate (AUGMENTIN) 875-125 MG per tablet 1 tablet, 1 tablet, Oral, Q12H, Real Hood MD      OBJECTIVE:  /90 (BP Location: Right arm, Patient Position: Sitting, Cuff Size: Adult)   Pulse 86   Temp 97.4 °F (36.3 °C) (Temporal)   Ht 165.1 cm (65\")   Wt 89.5 kg (197 lb 6.4 oz)   SpO2 97%   BMI 32.85 kg/m²    Physical Exam  Vitals and nursing note reviewed.   Constitutional:       General: She is not in acute distress.     Appearance: She is not diaphoretic.   HENT:      Head: Normocephalic and atraumatic.      Nose: Nose normal.   Eyes:      General: No scleral icterus.        Right eye: No discharge.         Left eye: No discharge.      Conjunctiva/sclera: Conjunctivae normal.   Neck:      Trachea: No tracheal deviation.   Pulmonary:      Effort: Pulmonary effort is normal.   Skin:     General: Skin is warm and dry.      Coloration: Skin is not pale.   Neurological:      Mental Status: She is alert and oriented to person, place, and time.   Psychiatric:         Behavior: Behavior normal.         Thought Content: Thought content normal.         Judgment: Judgment normal.         Assessment/Plan     Diagnosis Plan   1. Diverticulitis          Assessment & Plan  I had 9 tablets of Xifaxan 550 mg for the patient to use until her prescription is approved to pharmacy.  Continue this along with Augmentin, follow-up as needed         Solomon Edward D.O.  [facility]  Osteopathic Neuromusculoskeletal Medicine      "

## 2025-07-11 ENCOUNTER — TELEPHONE (OUTPATIENT)
Dept: SURGERY | Facility: CLINIC | Age: 65
End: 2025-07-11

## 2025-07-14 ENCOUNTER — TELEPHONE (OUTPATIENT)
Dept: SURGERY | Facility: CLINIC | Age: 65
End: 2025-07-14
Payer: COMMERCIAL

## 2025-07-14 DIAGNOSIS — K57.32 DIVERTICULITIS OF COLON: Primary | ICD-10-CM

## 2025-07-14 NOTE — TELEPHONE ENCOUNTER
Called patient to see how she is doing because she states pharmacy was unable to get her script for her Augmentin filled. Wanted to see how she is feeling and to see if the pharmacy was ever able to get her the medications.

## 2025-07-15 DIAGNOSIS — K57.32 DIVERTICULITIS OF COLON: Primary | ICD-10-CM

## 2025-08-19 ENCOUNTER — OFFICE VISIT (OUTPATIENT)
Dept: SURGERY | Facility: CLINIC | Age: 65
End: 2025-08-19
Payer: MEDICARE

## 2025-08-19 VITALS
WEIGHT: 197 LBS | BODY MASS INDEX: 32.82 KG/M2 | SYSTOLIC BLOOD PRESSURE: 130 MMHG | DIASTOLIC BLOOD PRESSURE: 74 MMHG | HEIGHT: 65 IN

## 2025-08-19 DIAGNOSIS — K57.32 DIVERTICULITIS OF COLON: Primary | ICD-10-CM

## (undated) DEVICE — TBG SMPL FLTR LINE NASL 02/C02 A/ BX/100

## (undated) DEVICE — THE CHANNEL CLEANING BRUSH IS A NYLON FLEXI BRUSH ATTACHED TO A FLEXIBLE PLASTIC SHEATH DESIGNED TO SAFELY REMOVE DEBRIS FROM FLEXIBLE ENDOSCOPES.

## (undated) DEVICE — Device: Brand: DEFENDO AIR/WATER/SUCTION AND BIOPSY VALVE

## (undated) DEVICE — CUFF,BP,DISP,1 TUBE,ADULT,HP: Brand: MEDLINE

## (undated) DEVICE — SENSR O2 OXIMAX FNGR A/ 18IN NONSTR

## (undated) DEVICE — MASK,OXYGEN,MED CONC,ADLT,7' TUB, UC: Brand: PENDING

## (undated) DEVICE — YANKAUER,BULB TIP WITH VENT: Brand: ARGYLE